# Patient Record
Sex: FEMALE | Race: WHITE | NOT HISPANIC OR LATINO | Employment: OTHER | URBAN - NONMETROPOLITAN AREA
[De-identification: names, ages, dates, MRNs, and addresses within clinical notes are randomized per-mention and may not be internally consistent; named-entity substitution may affect disease eponyms.]

---

## 2017-02-22 ENCOUNTER — APPOINTMENT (OUTPATIENT)
Dept: GENERAL RADIOLOGY | Facility: HOSPITAL | Age: 72
End: 2017-02-22

## 2017-02-22 ENCOUNTER — HOSPITAL ENCOUNTER (EMERGENCY)
Facility: HOSPITAL | Age: 72
Discharge: HOME | End: 2017-02-22
Attending: FAMILY MEDICINE | Admitting: FAMILY MEDICINE

## 2017-02-22 VITALS
WEIGHT: 170 LBS | RESPIRATION RATE: 22 BRPM | DIASTOLIC BLOOD PRESSURE: 72 MMHG | TEMPERATURE: 99.9 F | SYSTOLIC BLOOD PRESSURE: 170 MMHG | BODY MASS INDEX: 27.32 KG/M2 | HEIGHT: 66 IN | OXYGEN SATURATION: 92 % | HEART RATE: 87 BPM

## 2017-02-22 DIAGNOSIS — J40 BRONCHITIS: Primary | ICD-10-CM

## 2017-02-22 LAB
ALBUMIN SERPL-MCNC: 4.2 G/DL (ref 3.4–4.8)
ALBUMIN/GLOB SERPL: 1.3 G/DL (ref 1.1–1.8)
ALP SERPL-CCNC: 70 U/L (ref 38–126)
ALT SERPL W P-5'-P-CCNC: 26 U/L (ref 9–52)
ANION GAP SERPL CALCULATED.3IONS-SCNC: 14 MMOL/L (ref 5–15)
AST SERPL-CCNC: 34 U/L (ref 14–36)
BASOPHILS # BLD AUTO: 0.09 10*3/MM3 (ref 0–0.2)
BASOPHILS NFR BLD AUTO: 1.1 % (ref 0–2)
BILIRUB SERPL-MCNC: 0.6 MG/DL (ref 0.2–1.3)
BUN BLD-MCNC: 15 MG/DL (ref 7–21)
BUN/CREAT SERPL: 16.3 (ref 7–25)
CALCIUM SPEC-SCNC: 9.4 MG/DL (ref 8.4–10.2)
CHLORIDE SERPL-SCNC: 98 MMOL/L (ref 95–110)
CK MB SERPL-CCNC: 1.21 NG/ML (ref 0–5)
CK SERPL-CCNC: 232 U/L (ref 30–135)
CO2 SERPL-SCNC: 25 MMOL/L (ref 22–31)
CREAT BLD-MCNC: 0.92 MG/DL (ref 0.5–1)
DEPRECATED RDW RBC AUTO: 43.7 FL (ref 36.4–46.3)
EOSINOPHIL # BLD AUTO: 0.03 10*3/MM3 (ref 0–0.7)
EOSINOPHIL NFR BLD AUTO: 0.4 % (ref 0–7)
ERYTHROCYTE [DISTWIDTH] IN BLOOD BY AUTOMATED COUNT: 13.3 % (ref 11.5–14.5)
FLUAV AG NPH QL: NEGATIVE
FLUBV AG NPH QL IA: NEGATIVE
GFR SERPL CREATININE-BSD FRML MDRD: 60 ML/MIN/1.73 (ref 39–90)
GLOBULIN UR ELPH-MCNC: 3.3 GM/DL (ref 2.3–3.5)
GLUCOSE BLD-MCNC: 91 MG/DL (ref 60–100)
HCT VFR BLD AUTO: 37 % (ref 35–45)
HGB BLD-MCNC: 12.5 G/DL (ref 12–15.5)
IMM GRANULOCYTES # BLD: 0.02 10*3/MM3 (ref 0–0.02)
IMM GRANULOCYTES NFR BLD: 0.3 % (ref 0–0.5)
LYMPHOCYTES # BLD AUTO: 1.11 10*3/MM3 (ref 0.6–4.2)
LYMPHOCYTES NFR BLD AUTO: 14 % (ref 10–50)
MCH RBC QN AUTO: 30.6 PG (ref 26.5–34)
MCHC RBC AUTO-ENTMCNC: 33.8 G/DL (ref 31.4–36)
MCV RBC AUTO: 90.5 FL (ref 80–98)
MONOCYTES # BLD AUTO: 0.67 10*3/MM3 (ref 0–0.9)
MONOCYTES NFR BLD AUTO: 8.5 % (ref 0–12)
NEUTROPHILS # BLD AUTO: 6 10*3/MM3 (ref 2–8.6)
NEUTROPHILS NFR BLD AUTO: 75.7 % (ref 37–80)
NT-PROBNP SERPL-MCNC: 276 PG/ML (ref 0–900)
PLATELET # BLD AUTO: 212 10*3/MM3 (ref 150–450)
PMV BLD AUTO: 10.2 FL (ref 8–12)
POTASSIUM BLD-SCNC: 3.7 MMOL/L (ref 3.5–5.1)
PROT SERPL-MCNC: 7.5 G/DL (ref 6.3–8.6)
RBC # BLD AUTO: 4.09 10*6/MM3 (ref 3.77–5.16)
SODIUM BLD-SCNC: 137 MMOL/L (ref 137–145)
TROPONIN I SERPL-MCNC: <0.012 NG/ML
WBC NRBC COR # BLD: 7.92 10*3/MM3 (ref 3.2–9.8)

## 2017-02-22 PROCEDURE — 99283 EMERGENCY DEPT VISIT LOW MDM: CPT

## 2017-02-22 PROCEDURE — 82553 CREATINE MB FRACTION: CPT | Performed by: FAMILY MEDICINE

## 2017-02-22 PROCEDURE — 83880 ASSAY OF NATRIURETIC PEPTIDE: CPT | Performed by: FAMILY MEDICINE

## 2017-02-22 PROCEDURE — 85025 COMPLETE CBC W/AUTO DIFF WBC: CPT | Performed by: FAMILY MEDICINE

## 2017-02-22 PROCEDURE — 84484 ASSAY OF TROPONIN QUANT: CPT | Performed by: FAMILY MEDICINE

## 2017-02-22 PROCEDURE — 93005 ELECTROCARDIOGRAM TRACING: CPT | Performed by: FAMILY MEDICINE

## 2017-02-22 PROCEDURE — 82550 ASSAY OF CK (CPK): CPT | Performed by: FAMILY MEDICINE

## 2017-02-22 PROCEDURE — 87804 INFLUENZA ASSAY W/OPTIC: CPT | Performed by: FAMILY MEDICINE

## 2017-02-22 PROCEDURE — 93010 ELECTROCARDIOGRAM REPORT: CPT | Performed by: INTERNAL MEDICINE

## 2017-02-22 PROCEDURE — 96374 THER/PROPH/DIAG INJ IV PUSH: CPT

## 2017-02-22 PROCEDURE — 71020 HC CHEST PA AND LATERAL: CPT

## 2017-02-22 PROCEDURE — 80053 COMPREHEN METABOLIC PANEL: CPT | Performed by: FAMILY MEDICINE

## 2017-02-22 PROCEDURE — 25010000002 METHYLPREDNISOLONE PER 125 MG: Performed by: FAMILY MEDICINE

## 2017-02-22 RX ORDER — METHYLPREDNISOLONE SODIUM SUCCINATE 125 MG/2ML
125 INJECTION, POWDER, LYOPHILIZED, FOR SOLUTION INTRAMUSCULAR; INTRAVENOUS ONCE
Status: COMPLETED | OUTPATIENT
Start: 2017-02-22 | End: 2017-02-22

## 2017-02-22 RX ORDER — CITALOPRAM 20 MG/1
20 TABLET ORAL DAILY
COMMUNITY

## 2017-02-22 RX ORDER — LEVOTHYROXINE SODIUM 75 UG/1
75 CAPSULE ORAL DAILY
COMMUNITY

## 2017-02-22 RX ORDER — IPRATROPIUM BROMIDE AND ALBUTEROL SULFATE 2.5; .5 MG/3ML; MG/3ML
3 SOLUTION RESPIRATORY (INHALATION) ONCE
Status: COMPLETED | OUTPATIENT
Start: 2017-02-22 | End: 2017-02-22

## 2017-02-22 RX ORDER — OMEPRAZOLE 20 MG/1
20 CAPSULE, DELAYED RELEASE ORAL DAILY
COMMUNITY

## 2017-02-22 RX ORDER — METHYLPREDNISOLONE 4 MG/1
TABLET ORAL
Qty: 21 TABLET | Refills: 0 | Status: SHIPPED | OUTPATIENT
Start: 2017-02-22 | End: 2018-02-09

## 2017-02-22 RX ORDER — ALBUTEROL SULFATE 90 UG/1
2 AEROSOL, METERED RESPIRATORY (INHALATION) EVERY 4 HOURS PRN
Qty: 1 INHALER | Refills: 0 | Status: ON HOLD | OUTPATIENT
Start: 2017-02-22 | End: 2018-05-24

## 2017-02-22 RX ORDER — TEMAZEPAM 30 MG/1
30 CAPSULE ORAL NIGHTLY PRN
COMMUNITY

## 2017-02-22 RX ORDER — LOSARTAN POTASSIUM 50 MG/1
100 TABLET ORAL DAILY
COMMUNITY

## 2017-02-22 RX ADMIN — IPRATROPIUM BROMIDE AND ALBUTEROL SULFATE 3 ML: 2.5; .5 SOLUTION RESPIRATORY (INHALATION) at 21:01

## 2017-02-22 RX ADMIN — METHYLPREDNISOLONE SODIUM SUCCINATE 125 MG: 125 INJECTION, POWDER, FOR SOLUTION INTRAMUSCULAR; INTRAVENOUS at 21:05

## 2017-02-23 LAB
HOLD SPECIMEN: NORMAL
WHOLE BLOOD HOLD SPECIMEN: NORMAL

## 2017-02-23 NOTE — ED NOTES
Instructed to walk Pt around ED to monitor O2 level.Prior to walk pt O2 level was 91%, during the walk the Pt's O2 level dropped to 88%.      Prakash Limon  02/22/17 8490

## 2017-02-23 NOTE — ED PROVIDER NOTES
Subjective   HPI Comments: Pt placed on Doxycycline and albuterol yesterday at walk in clinic.     Patient is a 71 y.o. female presenting with shortness of breath.   Shortness of Breath   Severity:  Moderate  Onset quality:  Gradual  Duration:  5 days  Timing:  Constant  Progression:  Worsening  Chronicity:  New  Relieved by:  Nothing  Worsened by:  Nothing  Associated symptoms: chest pain (pleuritic on right), cough, fever (100.4) and wheezing    Associated symptoms: no abdominal pain, no diaphoresis, no ear pain, no headaches, no neck pain, no rash, no sore throat, no sputum production, no syncope and no vomiting    Risk factors: hx of cancer (breast CA)    Risk factors: no recent alcohol use, no obesity, no recent surgery and no tobacco use        Review of Systems   Constitutional: Positive for fever (100.4). Negative for appetite change, chills, diaphoresis and fatigue.   HENT: Negative for congestion, ear discharge, ear pain, nosebleeds, rhinorrhea, sinus pressure, sore throat and trouble swallowing.    Eyes: Negative for discharge and redness.   Respiratory: Positive for cough, shortness of breath and wheezing. Negative for apnea, sputum production and chest tightness.    Cardiovascular: Positive for chest pain (pleuritic on right). Negative for syncope.   Gastrointestinal: Negative for abdominal pain, diarrhea, nausea and vomiting.   Endocrine: Negative for polyuria.   Genitourinary: Negative for dysuria, frequency and urgency.   Musculoskeletal: Negative for myalgias and neck pain.   Skin: Negative for color change and rash.   Allergic/Immunologic: Negative for immunocompromised state.   Neurological: Negative for dizziness, seizures, syncope, weakness, light-headedness and headaches.   Hematological: Negative for adenopathy. Does not bruise/bleed easily.   Psychiatric/Behavioral: Negative for behavioral problems and confusion.   All other systems reviewed and are negative.      Past Medical History    Diagnosis Date   • Cancer    • Disease of thyroid gland    • Hypertension        Allergies   Allergen Reactions   • Penicillins        Past Surgical History   Procedure Laterality Date   • Mastectomy Right    • Appendectomy         History reviewed. No pertinent family history.    Social History     Social History   • Marital status:      Spouse name: N/A   • Number of children: N/A   • Years of education: N/A     Social History Main Topics   • Smoking status: Never Smoker   • Smokeless tobacco: None   • Alcohol use No   • Drug use: No   • Sexual activity: Not Asked     Other Topics Concern   • None     Social History Narrative   • None           Objective   Physical Exam   Constitutional: She is oriented to person, place, and time. She appears well-developed and well-nourished.   HENT:   Head: Normocephalic and atraumatic.   Nose: Nose normal.   Mouth/Throat: Oropharynx is clear and moist.   Eyes: Conjunctivae and EOM are normal. Pupils are equal, round, and reactive to light. Right eye exhibits no discharge. Left eye exhibits no discharge. No scleral icterus.   Neck: Normal range of motion. Neck supple. No tracheal deviation present.   Cardiovascular: Normal rate, regular rhythm and normal heart sounds.    No murmur heard.  Pulmonary/Chest: Effort normal. No stridor. No respiratory distress. She has decreased breath sounds. She has wheezes. She has rhonchi. She has no rales.   Abdominal: Soft. Bowel sounds are normal. She exhibits no distension and no mass. There is no tenderness. There is no rebound and no guarding.   Musculoskeletal: She exhibits no edema.   Neurological: She is alert and oriented to person, place, and time. Coordination normal.   Skin: Skin is warm and dry. No rash noted. No erythema.   Psychiatric: She has a normal mood and affect. Her behavior is normal. Thought content normal.   Nursing note and vitals reviewed.      Procedures         ED Course  ED Course   Comment By Time    Observation admission offered to patient for decrease O2 sat with ambulation, however patient states she feels fine and prefers to go home.  She will return if symptoms do not improve and she understands the importance of following up with her family medicine doctor, Dr. Villegas.  Patient is currently on day 2 of doxycycline, and will continue to take antibiotics at home. Kamran Ramirez MD 02/22 1256        Labs Reviewed   CK - Abnormal; Notable for the following:        Result Value    Creatine Kinase 232 (*)     All other components within normal limits   INFLUENZA ANTIGEN - Normal   COMPREHENSIVE METABOLIC PANEL - Normal   BNP (IN-HOUSE) - Normal   CK MB - Normal   TROPONIN (IN-HOUSE) - Normal   CBC WITH AUTO DIFFERENTIAL - Normal   RAPID STREP A SCREEN   RAINBOW DRAW    Narrative:     The following orders were created for panel order [place] Draw.  Procedure                               Abnormality         Status                     ---------                               -----------         ------                     Light Blue Top[[id number]]                                    In process                 Gold Top - SST[[id number]]                                    In process                   Please view results for these tests on the individual orders.   CBC AND DIFFERENTIAL    Narrative:     The following orders were created for panel order CBC & Differential.  Procedure                               Abnormality         Status                     ---------                               -----------         ------                     CBC Auto Differential[[id number]]         Normal              Final result                 Please view results for these tests on the individual orders.   LIGHT BLUE TOP   GOLD TOP - SST       XR Chest 2 View   Final Result   Mild cardiomegaly without radiographic evidence of   acute cardiopulmonary disease.      Electronically signed by:  Ida Anne MD  2/22/2017 8:39 PM CST    Workstation: [facility]    Final diagnoses:   Bronchitis            Kamran Ramirez MD  02/22/17 2572

## 2017-05-25 ENCOUNTER — HOSPITAL ENCOUNTER (EMERGENCY)
Facility: HOSPITAL | Age: 72
Discharge: HOME | End: 2017-05-25
Attending: EMERGENCY MEDICINE | Admitting: EMERGENCY MEDICINE

## 2017-05-25 ENCOUNTER — APPOINTMENT (OUTPATIENT)
Dept: GENERAL RADIOLOGY | Facility: HOSPITAL | Age: 72
End: 2017-05-25

## 2017-05-25 VITALS
RESPIRATION RATE: 68 BRPM | SYSTOLIC BLOOD PRESSURE: 187 MMHG | DIASTOLIC BLOOD PRESSURE: 83 MMHG | TEMPERATURE: 98 F | WEIGHT: 170 LBS | OXYGEN SATURATION: 97 % | HEART RATE: 64 BPM | BODY MASS INDEX: 27.32 KG/M2 | HEIGHT: 66 IN

## 2017-05-25 DIAGNOSIS — R07.89 CHEST WALL PAIN: ICD-10-CM

## 2017-05-25 DIAGNOSIS — R07.9 CHEST PAIN, UNSPECIFIED TYPE: ICD-10-CM

## 2017-05-25 DIAGNOSIS — R06.02 SHORTNESS OF BREATH: Primary | ICD-10-CM

## 2017-05-25 LAB
BASOPHILS # BLD AUTO: 0.04 10*3/MM3 (ref 0–0.2)
BASOPHILS NFR BLD AUTO: 0.5 % (ref 0–2)
CK MB SERPL-CCNC: 0.76 NG/ML (ref 0–5)
CK SERPL-CCNC: 72 U/L (ref 30–135)
DEPRECATED RDW RBC AUTO: 44.1 FL (ref 36.4–46.3)
EOSINOPHIL # BLD AUTO: 0.18 10*3/MM3 (ref 0–0.7)
EOSINOPHIL NFR BLD AUTO: 2.3 % (ref 0–7)
ERYTHROCYTE [DISTWIDTH] IN BLOOD BY AUTOMATED COUNT: 13.3 % (ref 11.5–14.5)
HCT VFR BLD AUTO: 38.9 % (ref 35–45)
HGB BLD-MCNC: 13 G/DL (ref 12–15.5)
HOLD SPECIMEN: NORMAL
HOLD SPECIMEN: NORMAL
IMM GRANULOCYTES # BLD: 0.02 10*3/MM3 (ref 0–0.02)
IMM GRANULOCYTES NFR BLD: 0.3 % (ref 0–0.5)
INR PPP: 1.01 (ref 0.8–1.2)
LIPASE SERPL-CCNC: 81 U/L (ref 23–300)
LYMPHOCYTES # BLD AUTO: 1.49 10*3/MM3 (ref 0.6–4.2)
LYMPHOCYTES NFR BLD AUTO: 18.8 % (ref 10–50)
MAGNESIUM SERPL-MCNC: 2.3 MG/DL (ref 1.6–2.3)
MCH RBC QN AUTO: 30.2 PG (ref 26.5–34)
MCHC RBC AUTO-ENTMCNC: 33.4 G/DL (ref 31.4–36)
MCV RBC AUTO: 90.3 FL (ref 80–98)
MONOCYTES # BLD AUTO: 0.58 10*3/MM3 (ref 0–0.9)
MONOCYTES NFR BLD AUTO: 7.3 % (ref 0–12)
NEUTROPHILS # BLD AUTO: 5.6 10*3/MM3 (ref 2–8.6)
NEUTROPHILS NFR BLD AUTO: 70.8 % (ref 37–80)
NT-PROBNP SERPL-MCNC: 61.7 PG/ML (ref 0–900)
PLATELET # BLD AUTO: 240 10*3/MM3 (ref 150–450)
PMV BLD AUTO: 9.7 FL (ref 8–12)
PROTHROMBIN TIME: 13.2 SECONDS (ref 11.1–15.3)
RBC # BLD AUTO: 4.31 10*6/MM3 (ref 3.77–5.16)
TROPONIN I SERPL-MCNC: <0.012 NG/ML
TSH SERPL DL<=0.05 MIU/L-ACNC: 1.01 MIU/ML (ref 0.46–4.68)
WBC NRBC COR # BLD: 7.91 10*3/MM3 (ref 3.2–9.8)
WHOLE BLOOD HOLD SPECIMEN: NORMAL
WHOLE BLOOD HOLD SPECIMEN: NORMAL

## 2017-05-25 PROCEDURE — 71020 HC CHEST PA AND LATERAL: CPT

## 2017-05-25 PROCEDURE — 84443 ASSAY THYROID STIM HORMONE: CPT | Performed by: EMERGENCY MEDICINE

## 2017-05-25 PROCEDURE — 83735 ASSAY OF MAGNESIUM: CPT | Performed by: EMERGENCY MEDICINE

## 2017-05-25 PROCEDURE — 83690 ASSAY OF LIPASE: CPT | Performed by: EMERGENCY MEDICINE

## 2017-05-25 PROCEDURE — 85025 COMPLETE CBC W/AUTO DIFF WBC: CPT | Performed by: EMERGENCY MEDICINE

## 2017-05-25 PROCEDURE — 93010 ELECTROCARDIOGRAM REPORT: CPT | Performed by: INTERNAL MEDICINE

## 2017-05-25 PROCEDURE — 84484 ASSAY OF TROPONIN QUANT: CPT | Performed by: EMERGENCY MEDICINE

## 2017-05-25 PROCEDURE — 82553 CREATINE MB FRACTION: CPT | Performed by: EMERGENCY MEDICINE

## 2017-05-25 PROCEDURE — 82550 ASSAY OF CK (CPK): CPT | Performed by: EMERGENCY MEDICINE

## 2017-05-25 PROCEDURE — 93005 ELECTROCARDIOGRAM TRACING: CPT

## 2017-05-25 PROCEDURE — 83880 ASSAY OF NATRIURETIC PEPTIDE: CPT | Performed by: EMERGENCY MEDICINE

## 2017-05-25 PROCEDURE — 93005 ELECTROCARDIOGRAM TRACING: CPT | Performed by: EMERGENCY MEDICINE

## 2017-05-25 PROCEDURE — 99284 EMERGENCY DEPT VISIT MOD MDM: CPT

## 2017-05-25 PROCEDURE — 85610 PROTHROMBIN TIME: CPT | Performed by: EMERGENCY MEDICINE

## 2017-05-25 RX ORDER — ATORVASTATIN CALCIUM 10 MG/1
10 TABLET, FILM COATED ORAL DAILY
COMMUNITY
End: 2018-05-01

## 2017-05-25 RX ORDER — LEVOTHYROXINE SODIUM 0.12 MG/1
125 TABLET ORAL DAILY
Status: ON HOLD | COMMUNITY
End: 2018-05-24 | Stop reason: SDUPTHER

## 2017-05-25 RX ORDER — SODIUM CHLORIDE 0.9 % (FLUSH) 0.9 %
10 SYRINGE (ML) INJECTION AS NEEDED
Status: DISCONTINUED | OUTPATIENT
Start: 2017-05-25 | End: 2017-05-26 | Stop reason: HOSPADM

## 2017-05-25 RX ORDER — TEMAZEPAM 30 MG/1
30 CAPSULE ORAL NIGHTLY PRN
COMMUNITY
End: 2018-05-01 | Stop reason: SDUPTHER

## 2017-05-25 RX ORDER — NADOLOL 20 MG/1
20 TABLET ORAL DAILY
Status: ON HOLD | COMMUNITY
End: 2018-05-24

## 2017-05-25 RX ORDER — CITALOPRAM 10 MG/1
10 TABLET ORAL DAILY
Status: ON HOLD | COMMUNITY
End: 2018-05-24 | Stop reason: SDUPTHER

## 2017-05-25 RX ORDER — LEVOFLOXACIN 500 MG/1
500 TABLET, FILM COATED ORAL DAILY
Qty: 10 TABLET | Refills: 0 | Status: SHIPPED | OUTPATIENT
Start: 2017-05-25 | End: 2018-05-01

## 2017-08-10 ENCOUNTER — TRANSCRIBE ORDERS (OUTPATIENT)
Dept: LAB | Facility: HOSPITAL | Age: 72
End: 2017-08-10

## 2017-08-10 ENCOUNTER — LAB (OUTPATIENT)
Dept: LAB | Facility: HOSPITAL | Age: 72
End: 2017-08-10

## 2017-08-10 DIAGNOSIS — M32.9 LUPUS (HCC): ICD-10-CM

## 2017-08-10 DIAGNOSIS — M32.9 LUPUS (HCC): Primary | ICD-10-CM

## 2017-08-10 LAB
ALBUMIN SERPL-MCNC: 4.6 G/DL (ref 3.4–4.8)
ALBUMIN/GLOB SERPL: 1.6 G/DL (ref 1.1–1.8)
ALP SERPL-CCNC: 75 U/L (ref 38–126)
ALT SERPL W P-5'-P-CCNC: 33 U/L (ref 9–52)
ANION GAP SERPL CALCULATED.3IONS-SCNC: 9 MMOL/L (ref 5–15)
AST SERPL-CCNC: 27 U/L (ref 14–36)
BASOPHILS # BLD AUTO: 0.04 10*3/MM3 (ref 0–0.2)
BASOPHILS NFR BLD AUTO: 0.6 % (ref 0–2)
BILIRUB SERPL-MCNC: 0.4 MG/DL (ref 0.2–1.3)
BILIRUB UR QL STRIP: NEGATIVE
BUN BLD-MCNC: 23 MG/DL (ref 7–21)
BUN/CREAT SERPL: 25.3 (ref 7–25)
CALCIUM SPEC-SCNC: 9.4 MG/DL (ref 8.4–10.2)
CHLORIDE SERPL-SCNC: 102 MMOL/L (ref 95–110)
CLARITY UR: CLEAR
CO2 SERPL-SCNC: 30 MMOL/L (ref 22–31)
COLOR UR: YELLOW
CREAT BLD-MCNC: 0.91 MG/DL (ref 0.5–1)
DEPRECATED RDW RBC AUTO: 45.6 FL (ref 36.4–46.3)
EOSINOPHIL # BLD AUTO: 0.26 10*3/MM3 (ref 0–0.7)
EOSINOPHIL NFR BLD AUTO: 4.2 % (ref 0–7)
ERYTHROCYTE [DISTWIDTH] IN BLOOD BY AUTOMATED COUNT: 13.8 % (ref 11.5–14.5)
ERYTHROCYTE [SEDIMENTATION RATE] IN BLOOD: 10 MM/HR (ref 0–20)
GFR SERPL CREATININE-BSD FRML MDRD: 61 ML/MIN/1.73 (ref 39–90)
GLOBULIN UR ELPH-MCNC: 2.9 GM/DL (ref 2.3–3.5)
GLUCOSE BLD-MCNC: 84 MG/DL (ref 60–100)
GLUCOSE UR STRIP-MCNC: NEGATIVE MG/DL
HCT VFR BLD AUTO: 38.8 % (ref 35–45)
HGB BLD-MCNC: 13 G/DL (ref 12–15.5)
HGB UR QL STRIP.AUTO: NEGATIVE
IMM GRANULOCYTES # BLD: 0.01 10*3/MM3 (ref 0–0.02)
IMM GRANULOCYTES NFR BLD: 0.2 % (ref 0–0.5)
KETONES UR QL STRIP: NEGATIVE
LEUKOCYTE ESTERASE UR QL STRIP.AUTO: ABNORMAL
LYMPHOCYTES # BLD AUTO: 1.66 10*3/MM3 (ref 0.6–4.2)
LYMPHOCYTES NFR BLD AUTO: 26.8 % (ref 10–50)
MCH RBC QN AUTO: 30.6 PG (ref 26.5–34)
MCHC RBC AUTO-ENTMCNC: 33.5 G/DL (ref 31.4–36)
MCV RBC AUTO: 91.3 FL (ref 80–98)
MONOCYTES # BLD AUTO: 0.42 10*3/MM3 (ref 0–0.9)
MONOCYTES NFR BLD AUTO: 6.8 % (ref 0–12)
NEUTROPHILS # BLD AUTO: 3.8 10*3/MM3 (ref 2–8.6)
NEUTROPHILS NFR BLD AUTO: 61.4 % (ref 37–80)
NITRITE UR QL STRIP: NEGATIVE
NRBC BLD MANUAL-RTO: 0 /100 WBC (ref 0–0)
PH UR STRIP.AUTO: <=5 [PH] (ref 5–9)
PLATELET # BLD AUTO: 281 10*3/MM3 (ref 150–450)
PMV BLD AUTO: 9.4 FL (ref 8–12)
POTASSIUM BLD-SCNC: 3.9 MMOL/L (ref 3.5–5.1)
PROT SERPL-MCNC: 7.5 G/DL (ref 6.3–8.6)
PROT UR QL STRIP: NEGATIVE
RBC # BLD AUTO: 4.25 10*6/MM3 (ref 3.77–5.16)
SODIUM BLD-SCNC: 141 MMOL/L (ref 137–145)
SP GR UR STRIP: 1.02 (ref 1–1.03)
UROBILINOGEN UR QL STRIP: ABNORMAL
WBC NRBC COR # BLD: 6.19 10*3/MM3 (ref 3.2–9.8)

## 2017-08-10 PROCEDURE — 85651 RBC SED RATE NONAUTOMATED: CPT

## 2017-08-10 PROCEDURE — 36415 COLL VENOUS BLD VENIPUNCTURE: CPT

## 2017-08-10 PROCEDURE — 85025 COMPLETE CBC W/AUTO DIFF WBC: CPT

## 2017-08-10 PROCEDURE — 86038 ANTINUCLEAR ANTIBODIES: CPT

## 2017-08-10 PROCEDURE — 80053 COMPREHEN METABOLIC PANEL: CPT

## 2017-08-10 PROCEDURE — 81003 URINALYSIS AUTO W/O SCOPE: CPT

## 2017-08-11 LAB — ANA SER QL: NEGATIVE

## 2018-02-09 ENCOUNTER — OFFICE VISIT (OUTPATIENT)
Dept: GASTROENTEROLOGY | Facility: CLINIC | Age: 73
End: 2018-02-09

## 2018-02-09 VITALS
BODY MASS INDEX: 26.87 KG/M2 | HEIGHT: 66 IN | HEART RATE: 83 BPM | WEIGHT: 167.2 LBS | DIASTOLIC BLOOD PRESSURE: 82 MMHG | SYSTOLIC BLOOD PRESSURE: 134 MMHG

## 2018-02-09 DIAGNOSIS — Z12.11 ENCOUNTER FOR SCREENING COLONOSCOPY: Primary | ICD-10-CM

## 2018-02-09 PROCEDURE — S0260 H&P FOR SURGERY: HCPCS | Performed by: NURSE PRACTITIONER

## 2018-02-09 RX ORDER — POLYETHYLENE GLYCOL 3350, SODIUM CHLORIDE, SODIUM BICARBONATE, POTASSIUM CHLORIDE 420; 11.2; 5.72; 1.48 G/4L; G/4L; G/4L; G/4L
4000 POWDER, FOR SOLUTION ORAL ONCE
Qty: 4000 ML | Refills: 0 | Status: SHIPPED | OUTPATIENT
Start: 2018-02-09 | End: 2018-02-09

## 2018-02-09 RX ORDER — DEXTROSE AND SODIUM CHLORIDE 5; .45 G/100ML; G/100ML
30 INJECTION, SOLUTION INTRAVENOUS CONTINUOUS PRN
Status: CANCELLED | OUTPATIENT
Start: 2018-03-02

## 2018-02-09 NOTE — PROGRESS NOTES
Chief Complaint   Patient presents with   • Colonoscopy     screening       Subjective    Em Munoz is a 72 y.o. female. she is being seen for consultation today at the request of Dr. Villegas  History of Present Illness  72-year-old female presents to discuss screening colonoscopy.  She denies any current abdominal pain.  States she has occasional epigastric and right upper quadrant pain she takes omeprazole daily.  She denies any nausea vomiting or dysphagia.  States her bowel movements are regular with no melena or hematochezia.  States her last colonoscopy was 10+ years ago do not have any reports of that to review.  Plan; schedule patient for screening colonoscopy.     The following portions of the patient's history were reviewed and updated as appropriate:   Past Medical History:   Diagnosis Date   • Cancer    • Disease of thyroid gland    • Hypertension      Past Surgical History:   Procedure Laterality Date   • APPENDECTOMY     • CHOLECYSTECTOMY     • MASTECTOMY Right      Family History   Problem Relation Age of Onset   • Heart attack Father    • Diabetes Father    • Hypertension Sister    • Diabetes Sister    • Heart attack Brother    • Hypertension Brother    • Stroke Brother    • Hypertension Sister      OB History     No data available        Current Outpatient Prescriptions   Medication Sig Dispense Refill   • albuterol (PROVENTIL HFA;VENTOLIN HFA) 108 (90 BASE) MCG/ACT inhaler Inhale 2 puffs Every 4 (Four) Hours As Needed for wheezing. 1 inhaler 0   • citalopram (CeleXA) 20 MG tablet Take 20 mg by mouth Daily.     • levothyroxine sodium (TIROSINT) 75 MCG capsule Take 75 mcg by mouth Daily.     • losartan (COZAAR) 50 MG tablet Take 50 mg by mouth Daily.     • omeprazole (priLOSEC) 20 MG capsule Take 20 mg by mouth Daily.     • temazepam (RESTORIL) 30 MG capsule Take 30 mg by mouth At Night As Needed for sleep.       No current facility-administered medications for this visit.      Allergies  "  Allergen Reactions   • Atorvastatin Other (See Comments)     Muscle pain   • Penicillins      Social History     Social History   • Marital status:      Spouse name: N/A   • Number of children: N/A   • Years of education: N/A     Social History Main Topics   • Smoking status: Never Smoker   • Smokeless tobacco: Never Used   • Alcohol use No   • Drug use: No   • Sexual activity: Defer     Other Topics Concern   • None     Social History Narrative       Review of Systems  Review of Systems   Constitutional: Negative for activity change, appetite change, chills, diaphoresis, fatigue, fever and unexpected weight change.   HENT: Negative for sore throat and trouble swallowing.    Respiratory: Positive for cough (better with omeprazole ). Negative for shortness of breath.    Gastrointestinal: Negative for abdominal distention, abdominal pain, anal bleeding, blood in stool, constipation, diarrhea, nausea, rectal pain and vomiting.   Musculoskeletal: Negative for arthralgias.   Skin: Negative for pallor.   Neurological: Negative for light-headedness.        /82 (BP Location: Left arm, Patient Position: Sitting, Cuff Size: Adult)  Pulse 83  Ht 167.6 cm (65.98\")  Wt 75.8 kg (167 lb 3.2 oz)  BMI 27 kg/m2    Objective    Physical Exam   Constitutional: She is oriented to person, place, and time. She appears well-developed and well-nourished. She is cooperative. No distress.   HENT:   Head: Normocephalic and atraumatic.   Neck: Normal range of motion. Neck supple. No thyromegaly present.   Cardiovascular: Normal rate, regular rhythm and normal heart sounds.    Pulmonary/Chest: Effort normal and breath sounds normal. She has no wheezes. She has no rhonchi. She has no rales.   Abdominal: Soft. Normal appearance and bowel sounds are normal. She exhibits no shifting dullness and no distension. There is no hepatosplenomegaly. There is no tenderness. There is no rigidity and no guarding. No hernia. "   Lymphadenopathy:     She has no cervical adenopathy.   Neurological: She is alert and oriented to person, place, and time.   Skin: Skin is warm, dry and intact. No rash noted. No pallor.   Psychiatric: She has a normal mood and affect. Her speech is normal.     Lab on 08/10/2017   Component Date Value Ref Range Status   • Glucose 08/10/2017 84  60 - 100 mg/dL Final   • BUN 08/10/2017 23* 7 - 21 mg/dL Final   • Creatinine 08/10/2017 0.91  0.50 - 1.00 mg/dL Final   • Sodium 08/10/2017 141  137 - 145 mmol/L Final   • Potassium 08/10/2017 3.9  3.5 - 5.1 mmol/L Final   • Chloride 08/10/2017 102  95 - 110 mmol/L Final   • CO2 08/10/2017 30.0  22.0 - 31.0 mmol/L Final   • Calcium 08/10/2017 9.4  8.4 - 10.2 mg/dL Final   • Total Protein 08/10/2017 7.5  6.3 - 8.6 g/dL Final   • Albumin 08/10/2017 4.60  3.40 - 4.80 g/dL Final   • ALT (SGPT) 08/10/2017 33  9 - 52 U/L Final   • AST (SGOT) 08/10/2017 27  14 - 36 U/L Final   • Alkaline Phosphatase 08/10/2017 75  38 - 126 U/L Final   • Total Bilirubin 08/10/2017 0.4  0.2 - 1.3 mg/dL Final   • eGFR Non African Amer 08/10/2017 61  39 - 90 mL/min/1.73 Final   • Globulin 08/10/2017 2.9  2.3 - 3.5 gm/dL Final   • A/G Ratio 08/10/2017 1.6  1.1 - 1.8 g/dL Final   • BUN/Creatinine Ratio 08/10/2017 25.3* 7.0 - 25.0 Final   • Anion Gap 08/10/2017 9.0  5.0 - 15.0 mmol/L Final   • Sed Rate 08/10/2017 10  0 - 20 mm/hr Final   • Color, UA 08/10/2017 Yellow  Yellow, Straw, Dark Yellow, Vivian Final   • Appearance, UA 08/10/2017 Clear  Clear Final   • pH, UA 08/10/2017 <=5.0  5.0 - 9.0 Final   • Specific Gravity, UA 08/10/2017 1.020  1.003 - 1.030 Final   • Glucose, UA 08/10/2017 Negative  Negative Final   • Ketones, UA 08/10/2017 Negative  Negative Final   • Bilirubin, UA 08/10/2017 Negative  Negative Final   • Blood, UA 08/10/2017 Negative  Negative Final   • Protein, UA 08/10/2017 Negative  Negative Final   • Leuk Esterase, UA 08/10/2017 Trace* Negative Final   • Nitrite, UA 08/10/2017  Negative  Negative Final   • Urobilinogen, UA 08/10/2017 0.2 E.U./dL  0.2 - 1.0 E.U./dL Final   • STORMY Direct 08/10/2017 Negative  Negative Final   • WBC 08/10/2017 6.19  3.20 - 9.80 10*3/mm3 Final   • RBC 08/10/2017 4.25  3.77 - 5.16 10*6/mm3 Final   • Hemoglobin 08/10/2017 13.0  12.0 - 15.5 g/dL Final   • Hematocrit 08/10/2017 38.8  35.0 - 45.0 % Final   • MCV 08/10/2017 91.3  80.0 - 98.0 fL Final   • MCH 08/10/2017 30.6  26.5 - 34.0 pg Final   • MCHC 08/10/2017 33.5  31.4 - 36.0 g/dL Final   • RDW 08/10/2017 13.8  11.5 - 14.5 % Final   • RDW-SD 08/10/2017 45.6  36.4 - 46.3 fl Final   • MPV 08/10/2017 9.4  8.0 - 12.0 fL Final   • Platelets 08/10/2017 281  150 - 450 10*3/mm3 Final   • Neutrophil % 08/10/2017 61.4  37.0 - 80.0 % Final   • Lymphocyte % 08/10/2017 26.8  10.0 - 50.0 % Final   • Monocyte % 08/10/2017 6.8  0.0 - 12.0 % Final   • Eosinophil % 08/10/2017 4.2  0.0 - 7.0 % Final   • Basophil % 08/10/2017 0.6  0.0 - 2.0 % Final   • Immature Grans % 08/10/2017 0.2  0.0 - 0.5 % Final   • Neutrophils, Absolute 08/10/2017 3.80  2.00 - 8.60 10*3/mm3 Final   • Lymphocytes, Absolute 08/10/2017 1.66  0.60 - 4.20 10*3/mm3 Final   • Monocytes, Absolute 08/10/2017 0.42  0.00 - 0.90 10*3/mm3 Final   • Eosinophils, Absolute 08/10/2017 0.26  0.00 - 0.70 10*3/mm3 Final   • Basophils, Absolute 08/10/2017 0.04  0.00 - 0.20 10*3/mm3 Final   • Immature Grans, Absolute 08/10/2017 0.01  0.00 - 0.02 10*3/mm3 Final   • nRBC 08/10/2017 0.0  0.0 - 0.0 /100 WBC Final     Assessment/Plan      1. Encounter for screening colonoscopy    .       Orders placed during this encounter include:    COLONOSCOPY  (N/A)    Review and/or summary of lab tests, radiology, procedures, medications. Review and summary of old records and obtaining of history. The risks and benefits of my recommendations, as well as other treatment options were discussed with the patient today. Questions were answered.    New Medications Ordered This Visit   Medications   •  polyethylene glycol-electrolytes (NULYTELY WITH FLAVOR PACKS) 420 g solution     Sig: Take 4,000 mL by mouth 1 (One) Time for 1 dose.     Dispense:  4000 mL     Refill:  0       Follow-up: Return in about 4 weeks (around 3/9/2018).          This document has been electronically signed by CATE Llanos on February 19, 2018 3:31 PM             Results for orders placed or performed in visit on 08/10/17   CBC Auto Differential   Result Value Ref Range    WBC 6.19 3.20 - 9.80 10*3/mm3    RBC 4.25 3.77 - 5.16 10*6/mm3    Hemoglobin 13.0 12.0 - 15.5 g/dL    Hematocrit 38.8 35.0 - 45.0 %    MCV 91.3 80.0 - 98.0 fL    MCH 30.6 26.5 - 34.0 pg    MCHC 33.5 31.4 - 36.0 g/dL    RDW 13.8 11.5 - 14.5 %    RDW-SD 45.6 36.4 - 46.3 fl    MPV 9.4 8.0 - 12.0 fL    Platelets 281 150 - 450 10*3/mm3    Neutrophil % 61.4 37.0 - 80.0 %    Lymphocyte % 26.8 10.0 - 50.0 %    Monocyte % 6.8 0.0 - 12.0 %    Eosinophil % 4.2 0.0 - 7.0 %    Basophil % 0.6 0.0 - 2.0 %    Immature Grans % 0.2 0.0 - 0.5 %    Neutrophils, Absolute 3.80 2.00 - 8.60 10*3/mm3    Lymphocytes, Absolute 1.66 0.60 - 4.20 10*3/mm3    Monocytes, Absolute 0.42 0.00 - 0.90 10*3/mm3    Eosinophils, Absolute 0.26 0.00 - 0.70 10*3/mm3    Basophils, Absolute 0.04 0.00 - 0.20 10*3/mm3    Immature Grans, Absolute 0.01 0.00 - 0.02 10*3/mm3    nRBC 0.0 0.0 - 0.0 /100 WBC   Urinalysis   Result Value Ref Range    Color, UA Yellow Yellow, Straw, Dark Yellow, Vivian    Appearance, UA Clear Clear    pH, UA <=5.0 5.0 - 9.0    Specific Gravity, UA 1.020 1.003 - 1.030    Glucose, UA Negative Negative    Ketones, UA Negative Negative    Bilirubin, UA Negative Negative    Blood, UA Negative Negative    Protein, UA Negative Negative    Leuk Esterase, UA Trace (A) Negative    Nitrite, UA Negative Negative    Urobilinogen, UA 0.2 E.U./dL 0.2 - 1.0 E.U./dL   Sedimentation Rate   Result Value Ref Range    Sed Rate 10 0 - 20 mm/hr   STORMY   Result Value Ref Range    STORMY Direct Negative Negative    Comprehensive Metabolic Panel   Result Value Ref Range    Glucose 84 60 - 100 mg/dL    BUN 23 (H) 7 - 21 mg/dL    Creatinine 0.91 0.50 - 1.00 mg/dL    Sodium 141 137 - 145 mmol/L    Potassium 3.9 3.5 - 5.1 mmol/L    Chloride 102 95 - 110 mmol/L    CO2 30.0 22.0 - 31.0 mmol/L    Calcium 9.4 8.4 - 10.2 mg/dL    Total Protein 7.5 6.3 - 8.6 g/dL    Albumin 4.60 3.40 - 4.80 g/dL    ALT (SGPT) 33 9 - 52 U/L    AST (SGOT) 27 14 - 36 U/L    Alkaline Phosphatase 75 38 - 126 U/L    Total Bilirubin 0.4 0.2 - 1.3 mg/dL    eGFR Non African Amer 61 39 - 90 mL/min/1.73    Globulin 2.9 2.3 - 3.5 gm/dL    A/G Ratio 1.6 1.1 - 1.8 g/dL    BUN/Creatinine Ratio 25.3 (H) 7.0 - 25.0    Anion Gap 9.0 5.0 - 15.0 mmol/L   Results for orders placed or performed during the hospital encounter of 02/22/17   [facility] - SST   Result Value Ref Range    Extra Tube Hold for add-ons.    CK-MB   Result Value Ref Range    CKMB 1.21 0.00 - 5.00 ng/mL   CBC Auto Differential   Result Value Ref Range    WBC 7.92 3.20 - 9.80 10*3/mm3    RBC 4.09 3.77 - 5.16 10*6/mm3    Hemoglobin 12.5 12.0 - 15.5 g/dL    Hematocrit 37.0 35.0 - 45.0 %    MCV 90.5 80.0 - 98.0 fL    MCH 30.6 26.5 - 34.0 pg    MCHC 33.8 31.4 - 36.0 g/dL    RDW 13.3 11.5 - 14.5 %    RDW-SD 43.7 36.4 - 46.3 fl    MPV 10.2 8.0 - 12.0 fL    Platelets 212 150 - 450 10*3/mm3    Neutrophil % 75.7 37.0 - 80.0 %    Lymphocyte % 14.0 10.0 - 50.0 %    Monocyte % 8.5 0.0 - 12.0 %    Eosinophil % 0.4 0.0 - 7.0 %    Basophil % 1.1 0.0 - 2.0 %    Immature Grans % 0.3 0.0 - 0.5 %    Neutrophils, Absolute 6.00 2.00 - 8.60 10*3/mm3    Lymphocytes, Absolute 1.11 0.60 - 4.20 10*3/mm3    Monocytes, Absolute 0.67 0.00 - 0.90 10*3/mm3    Eosinophils, Absolute 0.03 0.00 - 0.70 10*3/mm3    Basophils, Absolute 0.09 0.00 - 0.20 10*3/mm3    Immature Grans, Absolute 0.02 0.00 - 0.02 10*3/mm3   Light Blue Top   Result Value Ref Range    Extra Tube hold for add-on    Troponin   Result Value Ref Range    Troponin I <0.012  <=0.034 ng/mL   Influenza Antigen   Result Value Ref Range    Influenza A Ag, EIA Negative Negative    Influenza B Ag, EIA Negative Negative   BNP   Result Value Ref Range    proBNP 276.0 0.0 - 900.0 pg/mL   CK   Result Value Ref Range    Creatine Kinase 232 (H) 30 - 135 U/L   Comprehensive Metabolic Panel   Result Value Ref Range    Glucose 91 60 - 100 mg/dL    BUN 15 7 - 21 mg/dL    Creatinine 0.92 0.50 - 1.00 mg/dL    Sodium 137 137 - 145 mmol/L    Potassium 3.7 3.5 - 5.1 mmol/L    Chloride 98 95 - 110 mmol/L    CO2 25.0 22.0 - 31.0 mmol/L    Calcium 9.4 8.4 - 10.2 mg/dL    Total Protein 7.5 6.3 - 8.6 g/dL    Albumin 4.20 3.40 - 4.80 g/dL    ALT (SGPT) 26 9 - 52 U/L    AST (SGOT) 34 14 - 36 U/L    Alkaline Phosphatase 70 38 - 126 U/L    Total Bilirubin 0.6 0.2 - 1.3 mg/dL    eGFR Non African Amer 60 39 - 90 mL/min/1.73    Globulin 3.3 2.3 - 3.5 gm/dL    A/G Ratio 1.3 1.1 - 1.8 g/dL    BUN/Creatinine Ratio 16.3 7.0 - 25.0    Anion Gap 14.0 5.0 - 15.0 mmol/L     *Note: Due to a large number of results and/or encounters for the requested time period, some results have not been displayed. A complete set of results can be found in Results Review.

## 2018-03-30 ENCOUNTER — ANESTHESIA (OUTPATIENT)
Dept: GASTROENTEROLOGY | Facility: HOSPITAL | Age: 73
End: 2018-03-30

## 2018-03-30 ENCOUNTER — ANESTHESIA EVENT (OUTPATIENT)
Dept: GASTROENTEROLOGY | Facility: HOSPITAL | Age: 73
End: 2018-03-30

## 2018-03-30 ENCOUNTER — HOSPITAL ENCOUNTER (OUTPATIENT)
Facility: HOSPITAL | Age: 73
Setting detail: HOSPITAL OUTPATIENT SURGERY
Discharge: HOME | End: 2018-03-30
Attending: SURGERY | Admitting: SURGERY

## 2018-03-30 VITALS
BODY MASS INDEX: 26.82 KG/M2 | SYSTOLIC BLOOD PRESSURE: 100 MMHG | TEMPERATURE: 98.3 F | DIASTOLIC BLOOD PRESSURE: 58 MMHG | RESPIRATION RATE: 16 BRPM | OXYGEN SATURATION: 95 % | WEIGHT: 166.89 LBS | HEART RATE: 78 BPM | HEIGHT: 66 IN

## 2018-03-30 DIAGNOSIS — Z12.11 ENCOUNTER FOR SCREENING COLONOSCOPY: ICD-10-CM

## 2018-03-30 PROCEDURE — 25010000002 ONDANSETRON PER 1 MG: Performed by: NURSE ANESTHETIST, CERTIFIED REGISTERED

## 2018-03-30 PROCEDURE — 88305 TISSUE EXAM BY PATHOLOGIST: CPT | Performed by: PATHOLOGY

## 2018-03-30 PROCEDURE — 88305 TISSUE EXAM BY PATHOLOGIST: CPT | Performed by: SURGERY

## 2018-03-30 PROCEDURE — 45380 COLONOSCOPY AND BIOPSY: CPT | Performed by: SURGERY

## 2018-03-30 PROCEDURE — 25010000002 FENTANYL CITRATE (PF) 100 MCG/2ML SOLUTION: Performed by: NURSE ANESTHETIST, CERTIFIED REGISTERED

## 2018-03-30 PROCEDURE — 25010000002 PROPOFOL 10 MG/ML EMULSION: Performed by: NURSE ANESTHETIST, CERTIFIED REGISTERED

## 2018-03-30 PROCEDURE — 25010000002 MIDAZOLAM PER 1 MG: Performed by: NURSE ANESTHETIST, CERTIFIED REGISTERED

## 2018-03-30 RX ORDER — FENTANYL CITRATE 50 UG/ML
INJECTION, SOLUTION INTRAMUSCULAR; INTRAVENOUS AS NEEDED
Status: DISCONTINUED | OUTPATIENT
Start: 2018-03-30 | End: 2018-03-30 | Stop reason: SURG

## 2018-03-30 RX ORDER — ONDANSETRON 2 MG/ML
INJECTION INTRAMUSCULAR; INTRAVENOUS AS NEEDED
Status: DISCONTINUED | OUTPATIENT
Start: 2018-03-30 | End: 2018-03-30 | Stop reason: SURG

## 2018-03-30 RX ORDER — GLYCOPYRROLATE 0.2 MG/ML
INJECTION INTRAMUSCULAR; INTRAVENOUS AS NEEDED
Status: DISCONTINUED | OUTPATIENT
Start: 2018-03-30 | End: 2018-03-30 | Stop reason: SURG

## 2018-03-30 RX ORDER — DEXTROSE AND SODIUM CHLORIDE 5; .45 G/100ML; G/100ML
30 INJECTION, SOLUTION INTRAVENOUS CONTINUOUS PRN
Status: DISCONTINUED | OUTPATIENT
Start: 2018-03-30 | End: 2018-03-30 | Stop reason: HOSPADM

## 2018-03-30 RX ORDER — PROPOFOL 10 MG/ML
VIAL (ML) INTRAVENOUS AS NEEDED
Status: DISCONTINUED | OUTPATIENT
Start: 2018-03-30 | End: 2018-03-30 | Stop reason: SURG

## 2018-03-30 RX ORDER — MIDAZOLAM HYDROCHLORIDE 1 MG/ML
INJECTION INTRAMUSCULAR; INTRAVENOUS AS NEEDED
Status: DISCONTINUED | OUTPATIENT
Start: 2018-03-30 | End: 2018-03-30 | Stop reason: SURG

## 2018-03-30 RX ADMIN — PROPOFOL 20 MG: 10 INJECTION, EMULSION INTRAVENOUS at 11:15

## 2018-03-30 RX ADMIN — GLYCOPYRROLATE 0.2 MCG: 0.2 INJECTION, SOLUTION INTRAMUSCULAR; INTRAVENOUS at 11:18

## 2018-03-30 RX ADMIN — FENTANYL CITRATE 25 MCG: 50 INJECTION, SOLUTION INTRAMUSCULAR; INTRAVENOUS at 11:09

## 2018-03-30 RX ADMIN — DEXTROSE AND SODIUM CHLORIDE: 5; 450 INJECTION, SOLUTION INTRAVENOUS at 10:55

## 2018-03-30 RX ADMIN — PROPOFOL 20 MG: 10 INJECTION, EMULSION INTRAVENOUS at 11:20

## 2018-03-30 RX ADMIN — PROPOFOL 80 MG: 10 INJECTION, EMULSION INTRAVENOUS at 11:11

## 2018-03-30 RX ADMIN — MIDAZOLAM 1 MG: 1 INJECTION INTRAMUSCULAR; INTRAVENOUS at 11:09

## 2018-03-30 RX ADMIN — PROPOFOL 30 MG: 10 INJECTION, EMULSION INTRAVENOUS at 11:26

## 2018-03-30 RX ADMIN — DEXTROSE AND SODIUM CHLORIDE 30 ML/HR: 5; 450 INJECTION, SOLUTION INTRAVENOUS at 10:26

## 2018-03-30 RX ADMIN — FENTANYL CITRATE 25 MCG: 50 INJECTION, SOLUTION INTRAMUSCULAR; INTRAVENOUS at 11:20

## 2018-03-30 RX ADMIN — ONDANSETRON 4 MG: 2 INJECTION INTRAMUSCULAR; INTRAVENOUS at 11:13

## 2018-03-30 NOTE — ANESTHESIA PREPROCEDURE EVALUATION
Anesthesia Evaluation     history of anesthetic complications: PONV  NPO Solid Status: > 8 hours  NPO Liquid Status: > 8 hours           Airway   Mallampati: III  TM distance: <3 FB  Neck ROM: limited  Possible difficult intubation  Dental - normal exam     Pulmonary - negative pulmonary ROS    breath sounds clear to auscultation  Cardiovascular   Exercise tolerance: good (4-7 METS)    Rhythm: regular  Rate: normal    (+) hypertension,       Neuro/Psych- negative ROS  GI/Hepatic/Renal/Endo      Musculoskeletal     Abdominal    Substance History      OB/GYN          Other                      Anesthesia Plan    ASA 3     MAC     Anesthetic plan and risks discussed with patient.    Plan discussed with CRNA.

## 2018-03-30 NOTE — ANESTHESIA POSTPROCEDURE EVALUATION
Patient: Em Munoz    Procedure Summary     Date:  03/30/18 Room / Location:  [facility] ENDOSCOPY 2 / [facility]    Anesthesia Start:  1107 Anesthesia Stop:      Procedure:  COLONOSCOPY (N/A ) Diagnosis:       Encounter for screening colonoscopy      (Encounter for screening colonoscopy [Z12.11])    Surgeon:  Cristopher Godoy MD Provider:  Shereen Cerrato CRNA    Anesthesia Type:  MAC ASA Status:  3          Anesthesia Type: MAC  Last vitals  BP   145/79 (03/30/18 1016)   Temp   97 °F (36.1 °C) (03/30/18 1016)   Pulse   66 (03/30/18 1016)   Resp   16 (03/30/18 1016)     SpO2   99 % (03/30/18 1016)     Post Anesthesia Care and Evaluation    Patient location during evaluation: bedside  Patient participation: complete - patient participated  Level of consciousness: awake and alert  Pain score: 0  Pain management: adequate  Airway patency: patent  Anesthetic complications: No anesthetic complications  PONV Status: none  Cardiovascular status: hemodynamically stable  Respiratory status: spontaneous ventilation  Hydration status: acceptable

## 2018-04-02 LAB
LAB AP CASE REPORT: NORMAL
Lab: NORMAL
PATH REPORT.FINAL DX SPEC: NORMAL
PATH REPORT.GROSS SPEC: NORMAL

## 2018-05-01 ENCOUNTER — OFFICE VISIT (OUTPATIENT)
Dept: SURGERY | Facility: CLINIC | Age: 73
End: 2018-05-01

## 2018-05-01 VITALS
HEIGHT: 66 IN | BODY MASS INDEX: 27.32 KG/M2 | SYSTOLIC BLOOD PRESSURE: 124 MMHG | DIASTOLIC BLOOD PRESSURE: 68 MMHG | WEIGHT: 170 LBS

## 2018-05-01 DIAGNOSIS — Z86.010 HISTORY OF ADENOMATOUS POLYP OF COLON: Primary | ICD-10-CM

## 2018-05-01 DIAGNOSIS — K57.90 DIVERTICULOSIS OF INTESTINE WITHOUT BLEEDING, UNSPECIFIED INTESTINAL TRACT LOCATION: ICD-10-CM

## 2018-05-01 PROCEDURE — 99212 OFFICE O/P EST SF 10 MIN: CPT | Performed by: SURGERY

## 2018-05-01 NOTE — PATIENT INSTRUCTIONS

## 2018-05-24 ENCOUNTER — APPOINTMENT (OUTPATIENT)
Dept: CARDIOLOGY | Facility: HOSPITAL | Age: 73
End: 2018-05-24
Attending: INTERNAL MEDICINE

## 2018-05-24 ENCOUNTER — APPOINTMENT (OUTPATIENT)
Dept: CARDIOLOGY | Facility: HOSPITAL | Age: 73
End: 2018-05-24
Attending: FAMILY MEDICINE

## 2018-05-24 ENCOUNTER — HOSPITAL ENCOUNTER (OUTPATIENT)
Facility: HOSPITAL | Age: 73
Setting detail: OBSERVATION
Discharge: HOME | End: 2018-05-25
Attending: EMERGENCY MEDICINE | Admitting: FAMILY MEDICINE

## 2018-05-24 ENCOUNTER — APPOINTMENT (OUTPATIENT)
Dept: GENERAL RADIOLOGY | Facility: HOSPITAL | Age: 73
End: 2018-05-24

## 2018-05-24 DIAGNOSIS — R07.9 CHEST PAIN, RULE OUT ACUTE MYOCARDIAL INFARCTION: Primary | ICD-10-CM

## 2018-05-24 LAB
ALBUMIN SERPL-MCNC: 3.8 G/DL (ref 3.4–4.8)
ALBUMIN/GLOB SERPL: 1.4 G/DL (ref 1.1–1.8)
ALP SERPL-CCNC: 66 U/L (ref 38–126)
ALT SERPL W P-5'-P-CCNC: 32 U/L (ref 9–52)
ANION GAP SERPL CALCULATED.3IONS-SCNC: 8 MMOL/L (ref 5–15)
AST SERPL-CCNC: 31 U/L (ref 14–36)
BASOPHILS # BLD AUTO: 0.04 10*3/MM3 (ref 0–0.2)
BASOPHILS NFR BLD AUTO: 1 % (ref 0–2)
BILIRUB SERPL-MCNC: 0.4 MG/DL (ref 0.2–1.3)
BUN BLD-MCNC: 16 MG/DL (ref 7–21)
BUN/CREAT SERPL: 22.2 (ref 7–25)
CALCIUM SPEC-SCNC: 8.7 MG/DL (ref 8.4–10.2)
CHLORIDE SERPL-SCNC: 104 MMOL/L (ref 95–110)
CO2 SERPL-SCNC: 28 MMOL/L (ref 22–31)
CREAT BLD-MCNC: 0.72 MG/DL (ref 0.5–1)
DEPRECATED RDW RBC AUTO: 42 FL (ref 36.4–46.3)
EOSINOPHIL # BLD AUTO: 0.12 10*3/MM3 (ref 0–0.7)
EOSINOPHIL NFR BLD AUTO: 3 % (ref 0–7)
ERYTHROCYTE [DISTWIDTH] IN BLOOD BY AUTOMATED COUNT: 12.7 % (ref 11.5–14.5)
GFR SERPL CREATININE-BSD FRML MDRD: 80 ML/MIN/1.73 (ref 39–90)
GLOBULIN UR ELPH-MCNC: 2.8 GM/DL (ref 2.3–3.5)
GLUCOSE BLD-MCNC: 95 MG/DL (ref 60–100)
HBA1C MFR BLD: 5.8 % (ref 4–5.6)
HCT VFR BLD AUTO: 37 % (ref 35–45)
HGB BLD-MCNC: 12.2 G/DL (ref 12–15.5)
IMM GRANULOCYTES # BLD: 0.01 10*3/MM3 (ref 0–0.02)
IMM GRANULOCYTES NFR BLD: 0.3 % (ref 0–0.5)
LIPASE SERPL-CCNC: 49 U/L (ref 23–300)
LYMPHOCYTES # BLD AUTO: 0.99 10*3/MM3 (ref 0.6–4.2)
LYMPHOCYTES NFR BLD AUTO: 24.9 % (ref 10–50)
MCH RBC QN AUTO: 29.6 PG (ref 26.5–34)
MCHC RBC AUTO-ENTMCNC: 33 G/DL (ref 31.4–36)
MCV RBC AUTO: 89.8 FL (ref 80–98)
MONOCYTES # BLD AUTO: 0.34 10*3/MM3 (ref 0–0.9)
MONOCYTES NFR BLD AUTO: 8.6 % (ref 0–12)
NEUTROPHILS # BLD AUTO: 2.47 10*3/MM3 (ref 2–8.6)
NEUTROPHILS NFR BLD AUTO: 62.2 % (ref 37–80)
NT-PROBNP SERPL-MCNC: 53.8 PG/ML (ref 0–900)
PLATELET # BLD AUTO: 176 10*3/MM3 (ref 150–450)
PMV BLD AUTO: 10.7 FL (ref 8–12)
POTASSIUM BLD-SCNC: 3.9 MMOL/L (ref 3.5–5.1)
PROT SERPL-MCNC: 6.6 G/DL (ref 6.3–8.6)
RBC # BLD AUTO: 4.12 10*6/MM3 (ref 3.77–5.16)
SODIUM BLD-SCNC: 140 MMOL/L (ref 137–145)
TROPONIN I SERPL-MCNC: <0.012 NG/ML
WBC NRBC COR # BLD: 3.97 10*3/MM3 (ref 3.2–9.8)
WHOLE BLOOD HOLD SPECIMEN: NORMAL

## 2018-05-24 PROCEDURE — 93306 TTE W/DOPPLER COMPLETE: CPT

## 2018-05-24 PROCEDURE — 83880 ASSAY OF NATRIURETIC PEPTIDE: CPT | Performed by: EMERGENCY MEDICINE

## 2018-05-24 PROCEDURE — G0378 HOSPITAL OBSERVATION PER HR: HCPCS

## 2018-05-24 PROCEDURE — 85025 COMPLETE CBC W/AUTO DIFF WBC: CPT | Performed by: EMERGENCY MEDICINE

## 2018-05-24 PROCEDURE — 99284 EMERGENCY DEPT VISIT MOD MDM: CPT

## 2018-05-24 PROCEDURE — 83690 ASSAY OF LIPASE: CPT | Performed by: EMERGENCY MEDICINE

## 2018-05-24 PROCEDURE — 80053 COMPREHEN METABOLIC PANEL: CPT | Performed by: EMERGENCY MEDICINE

## 2018-05-24 PROCEDURE — 93010 ELECTROCARDIOGRAM REPORT: CPT | Performed by: INTERNAL MEDICINE

## 2018-05-24 PROCEDURE — 99214 OFFICE O/P EST MOD 30 MIN: CPT | Performed by: NURSE PRACTITIONER

## 2018-05-24 PROCEDURE — 71046 X-RAY EXAM CHEST 2 VIEWS: CPT

## 2018-05-24 PROCEDURE — 83036 HEMOGLOBIN GLYCOSYLATED A1C: CPT | Performed by: FAMILY MEDICINE

## 2018-05-24 PROCEDURE — 93306 TTE W/DOPPLER COMPLETE: CPT | Performed by: INTERNAL MEDICINE

## 2018-05-24 PROCEDURE — 84484 ASSAY OF TROPONIN QUANT: CPT | Performed by: EMERGENCY MEDICINE

## 2018-05-24 PROCEDURE — 93005 ELECTROCARDIOGRAM TRACING: CPT | Performed by: EMERGENCY MEDICINE

## 2018-05-24 RX ORDER — ALBUTEROL SULFATE 2.5 MG/3ML
2.5 SOLUTION RESPIRATORY (INHALATION) EVERY 6 HOURS PRN
Status: DISCONTINUED | OUTPATIENT
Start: 2018-05-24 | End: 2018-05-25 | Stop reason: HOSPADM

## 2018-05-24 RX ORDER — ACETAMINOPHEN 325 MG/1
650 TABLET ORAL EVERY 4 HOURS PRN
Status: DISCONTINUED | OUTPATIENT
Start: 2018-05-24 | End: 2018-05-25 | Stop reason: HOSPADM

## 2018-05-24 RX ORDER — TEMAZEPAM 15 MG/1
30 CAPSULE ORAL NIGHTLY PRN
Status: DISCONTINUED | OUTPATIENT
Start: 2018-05-24 | End: 2018-05-25 | Stop reason: HOSPADM

## 2018-05-24 RX ORDER — PANTOPRAZOLE SODIUM 40 MG/1
40 TABLET, DELAYED RELEASE ORAL EVERY MORNING
Status: DISCONTINUED | OUTPATIENT
Start: 2018-05-25 | End: 2018-05-25 | Stop reason: HOSPADM

## 2018-05-24 RX ORDER — SODIUM CHLORIDE 0.9 % (FLUSH) 0.9 %
1-10 SYRINGE (ML) INJECTION AS NEEDED
Status: DISCONTINUED | OUTPATIENT
Start: 2018-05-24 | End: 2018-05-25 | Stop reason: HOSPADM

## 2018-05-24 RX ORDER — ONDANSETRON 2 MG/ML
4 INJECTION INTRAMUSCULAR; INTRAVENOUS EVERY 6 HOURS PRN
Status: DISCONTINUED | OUTPATIENT
Start: 2018-05-24 | End: 2018-05-25 | Stop reason: HOSPADM

## 2018-05-24 RX ORDER — ASPIRIN 81 MG/1
81 TABLET ORAL DAILY
Status: DISCONTINUED | OUTPATIENT
Start: 2018-05-25 | End: 2018-05-25 | Stop reason: HOSPADM

## 2018-05-24 RX ORDER — CITALOPRAM 10 MG/1
10 TABLET ORAL DAILY
Status: DISCONTINUED | OUTPATIENT
Start: 2018-05-24 | End: 2018-05-25 | Stop reason: HOSPADM

## 2018-05-24 RX ORDER — LOSARTAN POTASSIUM 50 MG/1
50 TABLET ORAL DAILY
Status: DISCONTINUED | OUTPATIENT
Start: 2018-05-24 | End: 2018-05-25 | Stop reason: HOSPADM

## 2018-05-24 RX ORDER — ASPIRIN 325 MG
325 TABLET ORAL ONCE
Status: COMPLETED | OUTPATIENT
Start: 2018-05-24 | End: 2018-05-24

## 2018-05-24 RX ORDER — NADOLOL 20 MG/1
20 TABLET ORAL DAILY
Status: DISCONTINUED | OUTPATIENT
Start: 2018-05-24 | End: 2018-05-24

## 2018-05-24 RX ORDER — ALUMINA, MAGNESIA, AND SIMETHICONE 2400; 2400; 240 MG/30ML; MG/30ML; MG/30ML
15 SUSPENSION ORAL ONCE
Status: COMPLETED | OUTPATIENT
Start: 2018-05-24 | End: 2018-05-24

## 2018-05-24 RX ADMIN — LIDOCAINE HYDROCHLORIDE 15 ML: 20 SOLUTION ORAL; TOPICAL at 11:58

## 2018-05-24 RX ADMIN — TEMAZEPAM 30 MG: 15 CAPSULE ORAL at 20:37

## 2018-05-24 RX ADMIN — NITROGLYCERIN 0.5 INCH: 20 OINTMENT TOPICAL at 11:58

## 2018-05-24 RX ADMIN — ALUMINUM HYDROXIDE, MAGNESIUM HYDROXIDE, AND DIMETHICONE 15 ML: 400; 400; 40 SUSPENSION ORAL at 11:58

## 2018-05-24 RX ADMIN — ASPIRIN 325 MG: 325 TABLET ORAL at 11:57

## 2018-05-24 NOTE — H&P
[facility] Medicine Admission      Date of Admission: 2018      Primary Care Physician: Lucio Villegas MD      Chief Complaint:  Chest pain, dyspnea     HPI:  This 72-year-old  female with a history significant for hypertension, hyperlipidemia, and family history of coronary artery disease reported to the emergency department when she began having sharp substernal chest pain.  Patient also reports that while she was walking back to her house she began to experience dyspnea upon exertion and nausea.  States she has never had any previous symptoms.  No personal history, but her brother  of a myocardial infarction at the age of 55 and her father suffered from congestive heart failure.  Patient denied dizziness, syncope, presyncope.  Denies any recent fevers, chills, or any other illnesses.  Patient states that her pain was relieved by nitroglycerin and aspirin and that now it is essentially pressure on the left side of her chest.  No radiation.  Have discussed case with Dr. Rose of cardiology who has agreed to see the patient.  Consult appreciated.    Concurrent Medical History:  has a past medical history of Cancer; Disease of thyroid gland; Esophageal stricture; GERD (gastroesophageal reflux disease); Hiatal hernia; Hyperlipidemia; Hypertension; and Pneumonia.    Past Surgical History:  has a past surgical history that includes Cataract extraction, bilateral; Breast surgery; Mastectomy; Mastectomy (Right); Appendectomy; Cholecystectomy; and Colonoscopy (N/A, 3/30/2018).    Family History: family history includes Abnormal EKG in her son; COPD in her mother; Diabetes in her father, sister, and sister; Heart attack in her brother, brother, and father; Hypertension in her brother, sister, and sister; Stroke in her brother and brother.     Social History:  reports that she has never smoked. She has never used smokeless tobacco. She reports that she does  not drink alcohol or use drugs.    Allergies:   Allergies   Allergen Reactions   • Atorvastatin Other (See Comments)     Muscle pain   • Penicillins    • Penicillins Rash       Medications:     Prior to Admission medications    Medication Sig Start Date End Date Taking? Authorizing Provider   levothyroxine sodium (TIROSINT) 75 MCG capsule Take 75 mcg by mouth Daily.   Yes Historical Provider, MD   losartan (COZAAR) 50 MG tablet Take 50 mg by mouth Daily.   Yes Historical Provider, MD   Omega-3 Fatty Acids (OMEGA-3 EPA FISH OIL PO) Take 600 mg by mouth Daily.   Yes Historical Provider, MD   omeprazole (priLOSEC) 20 MG capsule Take 20 mg by mouth Daily.   Yes Historical Provider, MD   temazepam (RESTORIL) 30 MG capsule Take 30 mg by mouth At Night As Needed for sleep.   Yes Historical Provider, MD   citalopram (CeleXA) 10 MG tablet Take 10 mg by mouth Daily.  5/24/18 Yes Historical Provider, MD   levothyroxine (SYNTHROID, LEVOTHROID) 125 MCG tablet Take 125 mcg by mouth Daily.  5/24/18 Yes Historical Provider, MD   citalopram (CeleXA) 20 MG tablet Take 20 mg by mouth Daily. 1/2 tab daily    Historical Provider, MD   albuterol (PROVENTIL HFA;VENTOLIN HFA) 108 (90 BASE) MCG/ACT inhaler Inhale 2 puffs Every 4 (Four) Hours As Needed for wheezing. 2/22/17 5/24/18  Kamran Ramirez MD   nadolol (CORGARD) 20 MG tablet Take 20 mg by mouth Daily.  5/24/18  Historical Provider, MD     Review of Systems:  Review of Systems   Constitutional: Negative for chills and fever.   Respiratory: Positive for shortness of breath. Negative for wheezing.    Cardiovascular: Positive for chest pain. Negative for palpitations and leg swelling.   Gastrointestinal: Positive for nausea. Negative for abdominal pain and vomiting.   Genitourinary: Negative for decreased urine volume, dysuria and hematuria.   Neurological: Negative for dizziness, syncope and light-headedness.      Otherwise complete ROS is negative except as mentioned  above.    Physical Exam:   Temp:  [98.6 °F (37 °C)] 98.6 °F (37 °C)  Heart Rate:  [64-75] 64  Resp:  [19] 19  BP: (150-157)/(68-79) 150/68  Physical Exam   Constitutional: She is oriented to person, place, and time. She appears well-developed and well-nourished. No distress.   HENT:   Head: Normocephalic and atraumatic.   Cardiovascular: Normal rate and regular rhythm.    Pulmonary/Chest: Effort normal. No respiratory distress. She has no wheezes.   Abdominal: Soft. Bowel sounds are normal. She exhibits no distension. There is no tenderness.   Neurological: She is alert and oriented to person, place, and time.   Skin: Skin is warm and dry. Capillary refill takes less than 2 seconds. She is not diaphoretic.   Psychiatric: She has a normal mood and affect. Her behavior is normal.     Results Reviewed:  I have personally reviewed current lab, radiology, and data and agree with results.  Lab Results (last 24 hours)     Procedure Component Value Units Date/Time    Extra Tubes [[id number]] Collected:  05/24/18 1122    Specimen:  Blood from Blood, Venous Line Updated:  05/24/18 1231    Narrative:       The following orders were created for panel order Extra Tubes.  Procedure                               Abnormality         Status                     ---------                               -----------         ------                     Light Blue Top[[id number]]                                   Final result                 Please view results for these tests on the individual orders.    Light Blue Top [[id number]] Collected:  05/24/18 1122    Specimen:  Blood Updated:  05/24/18 1231     Extra Tube hold for add-on     Comment: Auto resulted       BNP [[id number]] Collected:  05/24/18 1119    Specimen:  Blood Updated:  05/24/18 1207    Troponin [[id number]]  (Normal) Collected:  05/24/18 1119    Specimen:  Blood Updated:  05/24/18 1147     Troponin I <0.012 ng/mL     Lipase [[id number]]  (Normal) Collected:  05/24/18 1119     Specimen:  Blood Updated:  05/24/18 1135     Lipase 49 U/L     Comprehensive Metabolic Panel [[id number]]  (Normal) Collected:  05/24/18 1119    Specimen:  Blood Updated:  05/24/18 1135     Glucose 95 mg/dL      BUN 16 mg/dL      Creatinine 0.72 mg/dL      Sodium 140 mmol/L      Potassium 3.9 mmol/L      Chloride 104 mmol/L      CO2 28.0 mmol/L      Calcium 8.7 mg/dL      Total Protein 6.6 g/dL      Albumin 3.80 g/dL      ALT (SGPT) 32 U/L      AST (SGOT) 31 U/L      Alkaline Phosphatase 66 U/L      Total Bilirubin 0.4 mg/dL      eGFR Non African Amer 80 mL/min/1.73      Globulin 2.8 gm/dL      A/G Ratio 1.4 g/dL      BUN/Creatinine Ratio 22.2     Anion Gap 8.0 mmol/L     Narrative:       The MDRD GFR formula is only valid for adults with stable renal function between ages 18 and 70.    CBC & Differential [[id number]] Collected:  05/24/18 1119    Specimen:  Blood Updated:  05/24/18 1125    Narrative:       The following orders were created for panel order CBC & Differential.  Procedure                               Abnormality         Status                     ---------                               -----------         ------                     CBC Auto Differential[[id number]]        Normal              Final result                 Please view results for these tests on the individual orders.    CBC Auto Differential [[id number]]  (Normal) Collected:  05/24/18 1119    Specimen:  Blood Updated:  05/24/18 1125     WBC 3.97 10*3/mm3      RBC 4.12 10*6/mm3      Hemoglobin 12.2 g/dL      Hematocrit 37.0 %      MCV 89.8 fL      MCH 29.6 pg      MCHC 33.0 g/dL      RDW 12.7 %      RDW-SD 42.0 fl      MPV 10.7 fL      Platelets 176 10*3/mm3      Neutrophil % 62.2 %      Lymphocyte % 24.9 %      Monocyte % 8.6 %      Eosinophil % 3.0 %      Basophil % 1.0 %      Immature Grans % 0.3 %      Neutrophils, Absolute 2.47 10*3/mm3      Lymphocytes, Absolute 0.99 10*3/mm3      Monocytes, Absolute 0.34 10*3/mm3      Eosinophils,  Absolute 0.12 10*3/mm3      Basophils, Absolute 0.04 10*3/mm3      Immature Grans, Absolute 0.01 10*3/mm3         Imaging Results (last 24 hours)     Procedure Component Value Units Date/Time    XR Chest 2 View [[id number]] Collected:  05/24/18 1118     Updated:  05/24/18 1156    Narrative:         EXAM:          Radiograph(s), Chest   VIEWS:    PA / Lat ; 2       DATE/TIME:  5/24/2018 11:53 AM CDT                INDICATION:   chest pain    COMPARISON:  CXR: 5/25/17             FINDINGS:             - lines/tubes:    none     - cardiac:         size within normal limits         - mediastinum: contour within normal limits         - lungs:         no focal air space process, pulmonary  interstitial edema, nodule(s)/mass             - pleura:         no evidence of  fluid                  - osseous:         unremarkable for age                  - misc.:         Impression:       CONCLUSION:        1. No evidence of an active cardiopulmonary process.                                                Electronically signed by:  GALILEA Cummins MD  5/24/2018 11:55  AM CDT Workstation: [id number]            Assessment:    Hospital Problem List     Chest pain, rule out acute myocardial infarction      Hypertension  Hyperlipidemia  Thyroid disorder        Plan:  Cardiology consult appreciated, continue trend cardiac enzymes, echocardiogram, lipid panel, continue to treat as hospital course dictates.          This document has been electronically signed by ANTONIO Ortiz on May 24, 2018 2:56 PM

## 2018-05-24 NOTE — ED PROVIDER NOTES
Subjective   72 years old female with history of hypertension, hyperlipidemia presented in the ER with a chief complaint of substernal chest pain.  Patient report chest pain started while she was tilling her garden for almost one hour which involved a bit exertion.  It was sharp initially, moderate intensity, started to improve after taking rest and 81 mg aspirin but still have discomfort/pressure-like sensation in the center of the chest.  Minimal shortness of breath momentarily which is gone now.  No palpitations.        History provided by:  Patient  Chest Pain   Pain location:  Substernal area  Pain quality: dull and sharp    Pain radiates to:  Does not radiate  Pain severity:  Moderate  Onset quality:  Sudden  Timing:  Constant  Progression:  Improving  Chronicity:  New  Context comment:  While tilling   Relieved by:  Nothing  Worsened by:  Nothing  Associated symptoms: nausea    Associated symptoms: no abdominal pain, no anorexia, no anxiety, no back pain, no claudication, no cough, no diaphoresis, no dizziness, no dysphagia, no fever, no heartburn, no numbness, no palpitations, no shortness of breath and no vomiting    Risk factors: high cholesterol and hypertension        Review of Systems   Constitutional: Negative for activity change, diaphoresis and fever.   HENT: Negative for congestion, nosebleeds, postnasal drip, sore throat and trouble swallowing.    Eyes: Negative for redness.   Respiratory: Positive for chest tightness. Negative for cough and shortness of breath.    Cardiovascular: Positive for chest pain. Negative for palpitations and claudication.   Gastrointestinal: Positive for nausea. Negative for abdominal pain, anorexia, heartburn and vomiting.   Genitourinary: Negative for flank pain.   Musculoskeletal: Negative for back pain.   Skin: Negative for color change.   Neurological: Negative for dizziness, syncope and numbness.   Psychiatric/Behavioral: Negative for agitation.       Past Medical  History:   Diagnosis Date   • Cancer     breast   • Disease of thyroid gland    • Esophageal stricture    • GERD (gastroesophageal reflux disease)    • Hiatal hernia    • Hyperlipidemia    • Hypertension    • Pneumonia        Allergies   Allergen Reactions   • Atorvastatin Other (See Comments)     Muscle pain   • Penicillins    • Penicillins Rash       Past Surgical History:   Procedure Laterality Date   • APPENDECTOMY     • BREAST SURGERY     • CATARACT EXTRACTION, BILATERAL     • CHOLECYSTECTOMY     • COLONOSCOPY N/A 3/30/2018    Procedure: COLONOSCOPY;  Surgeon: Cristopher Godoy MD;  Location: [facility] ENDOSCOPY;  Service: Gastroenterology   • MASTECTOMY     • MASTECTOMY Right        Family History   Problem Relation Age of Onset   • Heart attack Father    • Diabetes Father    • Hypertension Sister    • Diabetes Sister    • Heart attack Brother    • Hypertension Brother    • Stroke Brother    • Hypertension Sister    • COPD Mother    • Diabetes Sister    • Stroke Brother    • Heart attack Brother    • Abnormal EKG Son        Social History     Social History   • Marital status:      Social History Main Topics   • Smoking status: Never Smoker   • Smokeless tobacco: Never Used   • Alcohol use No   • Drug use: No   • Sexual activity: Defer     Other Topics Concern   • Not on file     Social History Narrative    ** Merged History Encounter **                Objective   Physical Exam   Constitutional: She is oriented to person, place, and time. She appears well-developed and well-nourished.   HENT:   Head: Normocephalic and atraumatic.   Nose: Nose normal.   Mouth/Throat: Oropharynx is clear and moist.   Eyes: Conjunctivae are normal.   Neck: Normal range of motion. Neck supple.   Cardiovascular: Normal rate, regular rhythm and normal heart sounds.    Pulmonary/Chest: Effort normal and breath sounds normal. No respiratory distress. She has no wheezes.   Abdominal: Soft. She exhibits no distension. There is no  tenderness. There is no guarding.   Musculoskeletal: Normal range of motion.   Neurological: She is alert and oriented to person, place, and time. No cranial nerve deficit. She exhibits normal muscle tone.   Skin: Skin is warm. Capillary refill takes less than 2 seconds.   Psychiatric: She has a normal mood and affect.   Nursing note and vitals reviewed.      ECG 12 Lead    Date/Time: 5/24/2018 10:45 AM  Performed by: JETT STAPLES  Authorized by: JETT STAPLES   Interpreted by physician  Rhythm: sinus rhythm  Rate: normal  BPM: 70  QRS axis: normal  Conduction: conduction normal  ST Segments: ST segments normal  T Waves: T waves normal  Other: no other findings  Clinical impression: normal ECG                 ED Course                  MDM  Number of Diagnoses or Management Options  Chest pain, rule out acute myocardial infarction:   Diagnosis management comments: 72 years old is evaluated for chest pain which started with exertional activity.  Her sharp pain is improved but still have some discomfort.  She is given aspirin and nitroglycerin but despite that she still have discomfort.  She has negative initial troponin.  Chest x-ray negative for any acute cardiopulmonary findings.  Unremarkable chemistry profile.  I have discussed with Dr. Aguero hospitalist on call and patient is being admitted for rule out.       Amount and/or Complexity of Data Reviewed  Clinical lab tests: ordered and reviewed  Tests in the radiology section of CPT®: ordered and reviewed  Discuss the patient with other providers: yes      Labs Reviewed   COMPREHENSIVE METABOLIC PANEL - Normal    Narrative:     The MDRD GFR formula is only valid for adults with stable renal function between ages 18 and 70.   LIPASE - Normal   TROPONIN (IN-HOUSE) - Normal   CBC WITH AUTO DIFFERENTIAL - Normal   BNP (IN-HOUSE)   CBC AND DIFFERENTIAL    Narrative:     The following orders were created for panel order CBC & Differential.  Procedure                                Abnormality         Status                     ---------                               -----------         ------                     CBC Auto Differential[[id number]]        Normal              Final result                 Please view results for these tests on the individual orders.   EXTRA TUBES    Narrative:     The following orders were created for panel order Extra Tubes.  Procedure                               Abnormality         Status                     ---------                               -----------         ------                     Light Blue Top[[id number]]                                   Final result                 Please view results for these tests on the individual orders.   LIGHT BLUE TOP       Xr Chest 2 View    Result Date: 5/24/2018  Narrative: EXAM:          Radiograph(s), Chest VIEWS:    PA / Lat ; 2     DATE/TIME:  5/24/2018 11:53 AM CDT            INDICATION:   chest pain  COMPARISON:  CXR: 5/25/17         FINDINGS:         - lines/tubes:    none   - cardiac:         size within normal limits       - mediastinum: contour within normal limits       - lungs:         no focal air space process, pulmonary interstitial edema, nodule(s)/mass           - pleura:         no evidence of  fluid                - osseous:         unremarkable for age                - misc.:        Impression: CONCLUSION:    1. No evidence of an active cardiopulmonary process.                                  Electronically signed by:  GALILEA Cummins MD  5/24/2018 11:55 AM CDT Workstation: [id number]          Final diagnoses:   Chest pain, rule out acute myocardial infarction            Tristin Paulson MD  05/24/18 6510

## 2018-05-24 NOTE — CONSULTS
"    [facility] Cardiology Consult    Referring Provider: Dr LYLY López    Reason for Consultation: Chest pain  Cardiologist: Dr Rose    Patient Care Team:  Lucio Villegas MD as PCP - General (Family Medicine)  Lucio Villegas MD (Family Medicine)   LOS: 0 days     Subjective .     History of present illness:    The patient is a 72-year-old female who presented to [facility] emergency department at approximately 11 AM secondary to concerns for chest pain.  The patient reports that at approximately 9 AM she went outside to till her garden and approximately one hour later (10 AM) she had onset of substernal chest pain.  She indicates that she return to her house (which [place] approximately one [place] city block) and experienced associated dyspnea as well as nausea without vomiting.  She is indicating that it took her family members \"a little while\" to get ready to bring her to the hospital and during that period of time the chest pain subsided.  However, in further discussion there does appear to be a pattern of intermittent chest pressure.    She reports concurrent medical history to be inclusive of hypertension, hyperlipidemia(intolerant to prior statins secondary to myalgia), history of right breast cancer for which she has undergone mastectomy, as well as chemotherapy and radiation approximately 10-11 years prior and hypothyroidism.  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  She denies any prior cardiac workup; brother  at approximate age 55 secondary to myocardial infarction.   There is negative history of personal or secondhand tobacco use nor use of alcoholic beverages.    Chest Pain:  Location: Substernal  Duration: Intermittent now for approximately 6-7 hours  Quality: Sharp type sensation; pressure  Intensity: Moderate  Precipitating factors: Exertional: [place]  Aggravating factors: " Exertion  Alleviating factors: Rest plus minus sublingual nitroglycerin  Associated symptoms: Shortness of breath, nausea without vomiting    Review of Systems   Constitutional: Negative for activity change, appetite change, chills and fever.   HENT: Negative.    Eyes: Negative.    Respiratory: Positive for chest tightness. Negative for apnea, cough, choking, wheezing and stridor. Shortness of breath: now at this time.    Cardiovascular: Negative for palpitations and leg swelling. Chest pain: as presenting.   Gastrointestinal: Negative for abdominal distention, abdominal pain and constipation.   Endocrine: Negative for polydipsia, polyphagia and polyuria.   Genitourinary: Negative.    Musculoskeletal: Negative.    Skin: Negative for pallor, rash and wound.   Allergic/Immunologic: Negative.    Neurological: Negative for dizziness, syncope and light-headedness.   Hematological: Does not bruise/bleed easily.   Psychiatric/Behavioral: Negative for agitation, behavioral problems, confusion and suicidal ideas. The patient is not nervous/anxious and is not hyperactive.      History  Past Medical History:   Diagnosis Date   • Cancer     breast   • Disease of thyroid gland    • Esophageal stricture    • GERD (gastroesophageal reflux disease)    • Hiatal hernia    • Hyperlipidemia    • Hypertension    • Pneumonia    ,   Past Surgical History:   Procedure Laterality Date   • APPENDECTOMY     • BREAST SURGERY     • CATARACT EXTRACTION, BILATERAL     • CHOLECYSTECTOMY     • COLONOSCOPY N/A 3/30/2018    Procedure: COLONOSCOPY;  Surgeon: Cristopher Godoy MD;  Location: [facility] ENDOSCOPY;  Service: Gastroenterology   • MASTECTOMY     • MASTECTOMY Right    ,   Family History   Problem Relation Age of Onset   • Heart attack Father    • Diabetes Father    • Hypertension Sister    • Diabetes Sister    • Heart attack Brother    • Hypertension Brother    • Stroke Brother    • Hypertension Sister    • COPD Mother    • Diabetes Sister    •  "Stroke Brother    • Heart attack Brother    • Abnormal EKG Son    ,   Social History   Substance Use Topics   • Smoking status: Never Smoker   • Smokeless tobacco: Never Used   • Alcohol use No   ,  and Allergies:  Atorvastatin; Penicillins; and Penicillins    Objective     Dietary Orders     Start     Ordered    05/24/18 1432  Diet Regular; Cardiac  Diet Effective Now     Question Answer Comment   Diet Texture / Consistency Regular    Common Modifiers Cardiac        05/24/18 1431        Respiratory:  No Data Recorded   Weight  Min: 77 kg (169 lb 12.8 oz)  Max: 78.1 kg (172 lb 3.2 oz)     Vital Sign Min/Max for last 24 hours  Temp  Min: 96.5 °F (35.8 °C)  Max: 98.6 °F (37 °C)   BP  Min: 115/61  Max: 157/79   Pulse  Min: 54  Max: 75   Resp  Min: 18  Max: 19   SpO2  Min: 92 %  Max: 97 %   No Data Recorded   Weight  Min: 77 kg (169 lb 12.8 oz)  Max: 78.1 kg (172 lb 3.2 oz)     Flowsheet Rows      First Filed Value   Admission Height  167.6 cm (66\") Documented at 05/24/2018 1040   Admission Weight  78.1 kg (172 lb 3.2 oz) Documented at 05/24/2018 1040        1    05/24/18  1040 05/24/18  1417   Weight: 78.1 kg (172 lb 3.2 oz) 77 kg (169 lb 12.8 oz)     No intake/output data recorded.    Physical Exam   Constitutional: She is oriented to person, place, and time. She appears well-developed and well-nourished. No distress.   HENT:   Head: Normocephalic and atraumatic.   Eyes: Right eye exhibits no discharge. Left eye exhibits no discharge. No scleral icterus.   Neck: Neck supple. No JVD present.   Cardiovascular: Normal rate, regular rhythm, normal heart sounds and intact distal pulses.  Exam reveals no gallop and no friction rub.    No murmur heard.  Pulmonary/Chest: Effort normal and breath sounds normal. No stridor. No respiratory distress. She has no wheezes. She has no rales. She exhibits no tenderness.   Abdominal: Soft. She exhibits no distension. There is no tenderness.   Musculoskeletal: She exhibits no edema or " tenderness.   Neurological: She is alert and oriented to person, place, and time.   Skin: Skin is warm and dry. Capillary refill takes less than 2 seconds. No rash noted. She is not diaphoretic.   Psychiatric: She has a normal mood and affect. Her behavior is normal. Judgment and thought content normal.   Vitals reviewed.    96.5 °F (35.8 °C) (Temporal Artery ) 54 115/61 18 94% 77 kg (169 lb 12.8 oz) Body mass index is 27.42 kg/m².    Central Lines/PICC: absent         Current Facility-Administered Medications:   •  acetaminophen (TYLENOL) tablet 650 mg, 650 mg, Oral, Q4H PRN, Blue López MD  •  albuterol (PROVENTIL) nebulizer solution 0.083% 2.5 mg/3mL, 2.5 mg, Nebulization, Q6H PRN, Blue López MD  •  [START ON 5/25/2018] aspirin EC tablet 81 mg, 81 mg, Oral, Daily, Blue López MD  •  citalopram (CeleXA) tablet 10 mg, 10 mg, Oral, Daily, Blue López MD  •  losartan (COZAAR) tablet 50 mg, 50 mg, Oral, Daily, Blue López MD  •  nadolol (CORGARD) tablet 20 mg, 20 mg, Oral, Daily, Blue López MD  •  nitroglycerin (NITROSTAT) ointment 1 inch, 1 inch, Topical, Q6H PRN, Blue López MD  •  ondansetron (ZOFRAN) injection 4 mg, 4 mg, Intravenous, Q6H PRN, Blue López MD  •  [START ON 5/25/2018] pantoprazole (PROTONIX) EC tablet 40 mg, 40 mg, Oral, QAM, Blue López MD  •  sodium chloride 0.9 % flush 1-10 mL, 1-10 mL, Intravenous, PRN, Blue López MD  •  temazepam (RESTORIL) capsule 30 mg, 30 mg, Oral, Nightly PRN, Blue López MD    Infusions:     Prescriptions Prior to Admission   Medication Sig Dispense Refill Last Dose   • levothyroxine sodium (TIROSINT) 75 MCG capsule Take 75 mcg by mouth Daily.   5/24/2018 at 0700   • losartan (COZAAR) 50 MG tablet Take 50 mg by mouth Daily.   5/24/2018 at 0700   • Omega-3 Fatty Acids (OMEGA-3 EPA FISH OIL PO) Take 600 mg by mouth Daily.   5/24/2018 at 0700   • omeprazole (priLOSEC) 20 MG capsule Take 20 mg by mouth Daily.   5/24/2018 at 0700   • temazepam  (RESTORIL) 30 MG capsule Take 30 mg by mouth At Night As Needed for sleep.   5/23/2018 at 2100   • citalopram (CeleXA) 20 MG tablet Take 20 mg by mouth Daily. 1/2 tab daily   5/24/2018 at 0800     Data Reviewed:    Electrocardiogram:  ECG/EMG Results (last 24 hours)     Procedure Component Value Units Date/Time    ECG 12 Lead [[id number]] Collected:  05/24/18 1037     Updated:  05/24/18 1243    SCANNED EKG [[id number]] Resulted:  05/24/18      Updated:  05/24/18 1259          Xr Chest 2 View    Result Date: 5/24/2018  CONCLUSION:    1. No evidence of an active cardiopulmonary process.                                  Electronically signed by:  GALILEA Cummins MD  5/24/2018 11:55 AM CDT Workstation: [id number]      Results from last 7 days  Lab Units 05/24/18  1119   SODIUM mmol/L 140   POTASSIUM mmol/L 3.9   CHLORIDE mmol/L 104   CO2 mmol/L 28.0   BUN mg/dL 16   CREATININE mg/dL 0.72   CALCIUM mg/dL 8.7   BILIRUBIN mg/dL 0.4   ALK PHOS U/L 66   ALT (SGPT) U/L 32   AST (SGOT) U/L 31   GLUCOSE mg/dL 95     Estimated Creatinine Clearance: 67.6 mL/min (by C-G formula based on SCr of 0.72 mg/dL).    Results from last 7 days  Lab Units 05/24/18  1119   WBC 10*3/mm3 3.97   HEMOGLOBIN g/dL 12.2   PLATELETS 10*3/mm3 176       Lab Results   Component Value Date    CKTOTAL 72 05/25/2017    CKMB 0.76 05/25/2017    TROPONINI <0.012 05/24/2018     Lab Results   Component Value Date    PROBNP 53.8 05/24/2018     The following portions of the patient's history were reviewed and updated as appropriate: allergies, current medications, problem list,  past medical history, surgical history, family history and social history.    Recent images independently reviewed.    Available laboratory values reviewed.    Assessment/Plan     Active Problems:    Chest pain, rule out acute myocardial infarction    The patient presents with chest discomfort in the setting of high probability for coronary artery disease based on retest risk factor  analysis.    I discussed the patients findings and my recommendations with patient     Following discussion, the tentative plan will be that if continuation of findings consistent with negative troponins and normal EKG we will proceed with coronary CTA on 05/25/2018.  As her heart rate is with findings of sinus bradycardia approximate 56 bpm upon today's evaluation, I will not place when necessary orders for beta blocker therapy regarding procedure at this time.  Should troponins found to be indicative of myocardial injury, Dr. Rose will evaluate and plan accordingly.  Additional plans noted for laboratory diagnostics in the a.m. 05/25/2018 regarding CBC, BMP, lipid panel, TSH and serial troponins.  Electrocardiogram is also ordered for follow-up 05/25/2018 as well as when necessary.  Transthoracic echocardiogram to be performed and will be reviewed per Dr. Rose.    Medications at this time:  Aspirin 325 (emergency department 05/24/2018)  Continuation aspirin 81 mg daily  Corgard 20 mg daily (Patient indicates that she no longer takes this medication; perhaps related to bradycardia) thus will hold for now  Losartan 50 mg daily  Nitropaste 0.5 inches (emergency department 05/24/2018)  Celexa 10 mg daily  Protonix 40 mg daily    Thank you for allowing me to participate in the evaluation and management of this patient.        Taisha Webber, CATE  05/24/18  3:21 PM

## 2018-05-24 NOTE — PLAN OF CARE
Problem: Patient Care Overview  Goal: Plan of Care Review  Outcome: Ongoing (interventions implemented as appropriate)   05/24/18 1810   Coping/Psychosocial   Plan of Care Reviewed With patient   Plan of Care Review   Progress no change   OTHER   Outcome Summary Patient admitted today, had an echo and will be getting a CTA of coronaries in the morning.      Goal: Individualization and Mutuality  Outcome: Ongoing (interventions implemented as appropriate)    Goal: Discharge Needs Assessment  Outcome: Ongoing (interventions implemented as appropriate)      Problem: Cardiac: ACS (Acute Coronary Syndrome) (Adult)  Goal: Signs and Symptoms of Listed Potential Problems Will be Absent, Minimized or Managed (Cardiac: ACS)  Outcome: Ongoing (interventions implemented as appropriate)   05/24/18 1810   Goal/Outcome Evaluation   Problems Assessed (Acute Coronary Syndrome) all   Problems Present (Acute Coronary Syn) none

## 2018-05-25 ENCOUNTER — APPOINTMENT (OUTPATIENT)
Dept: CT IMAGING | Facility: HOSPITAL | Age: 73
End: 2018-05-25

## 2018-05-25 VITALS
HEART RATE: 84 BPM | SYSTOLIC BLOOD PRESSURE: 125 MMHG | DIASTOLIC BLOOD PRESSURE: 66 MMHG | BODY MASS INDEX: 27.48 KG/M2 | RESPIRATION RATE: 18 BRPM | WEIGHT: 171 LBS | HEIGHT: 66 IN | OXYGEN SATURATION: 93 % | TEMPERATURE: 98.1 F

## 2018-05-25 LAB
ANION GAP SERPL CALCULATED.3IONS-SCNC: 9 MMOL/L (ref 5–15)
ARTICHOKE IGE QN: 106 MG/DL (ref 1–129)
BASOPHILS # BLD AUTO: 0.05 10*3/MM3 (ref 0–0.2)
BASOPHILS NFR BLD AUTO: 0.9 % (ref 0–2)
BH CV ECHO MEAS - ACS: 1.9 CM
BH CV ECHO MEAS - AO MAX PG (FULL): 3 MMHG
BH CV ECHO MEAS - AO MAX PG: 5.9 MMHG
BH CV ECHO MEAS - AO MEAN PG (FULL): 1 MMHG
BH CV ECHO MEAS - AO MEAN PG: 3 MMHG
BH CV ECHO MEAS - AO ROOT AREA (BSA CORRECTED): 1.8
BH CV ECHO MEAS - AO ROOT AREA: 9.1 CM^2
BH CV ECHO MEAS - AO ROOT DIAM: 3.4 CM
BH CV ECHO MEAS - AO V2 MAX: 121 CM/SEC
BH CV ECHO MEAS - AO V2 MEAN: 87.9 CM/SEC
BH CV ECHO MEAS - AO V2 VTI: 27.8 CM
BH CV ECHO MEAS - AVA(I,A): 2.6 CM^2
BH CV ECHO MEAS - AVA(I,D): 2.6 CM^2
BH CV ECHO MEAS - AVA(V,A): 2.4 CM^2
BH CV ECHO MEAS - AVA(V,D): 2.4 CM^2
BH CV ECHO MEAS - BSA(HAYCOCK): 1.9 M^2
BH CV ECHO MEAS - BSA: 1.8 M^2
BH CV ECHO MEAS - BZI_BMI: 28.1 KILOGRAMS/M^2
BH CV ECHO MEAS - BZI_METRIC_HEIGHT: 165.1 CM
BH CV ECHO MEAS - BZI_METRIC_WEIGHT: 76.7 KG
BH CV ECHO MEAS - EDV(CUBED): 180.4 ML
BH CV ECHO MEAS - EDV(TEICH): 156.8 ML
BH CV ECHO MEAS - EF(CUBED): 77.8 %
BH CV ECHO MEAS - EF(TEICH): 69.3 %
BH CV ECHO MEAS - ESV(CUBED): 40 ML
BH CV ECHO MEAS - ESV(TEICH): 48.1 ML
BH CV ECHO MEAS - FS: 39.5 %
BH CV ECHO MEAS - IVS/LVPW: 1.1
BH CV ECHO MEAS - IVSD: 1.2 CM
BH CV ECHO MEAS - LA DIMENSION: 3.8 CM
BH CV ECHO MEAS - LA/AO: 1.1
BH CV ECHO MEAS - LV MASS(C)D: 263.9 GRAMS
BH CV ECHO MEAS - LV MASS(C)DI: 143.3 GRAMS/M^2
BH CV ECHO MEAS - LV MAX PG: 2.9 MMHG
BH CV ECHO MEAS - LV MEAN PG: 2 MMHG
BH CV ECHO MEAS - LV V1 MAX: 85 CM/SEC
BH CV ECHO MEAS - LV V1 MEAN: 57.3 CM/SEC
BH CV ECHO MEAS - LV V1 VTI: 20.6 CM
BH CV ECHO MEAS - LVIDD: 5.7 CM
BH CV ECHO MEAS - LVIDS: 3.4 CM
BH CV ECHO MEAS - LVOT AREA (M): 3.5 CM^2
BH CV ECHO MEAS - LVOT AREA: 3.5 CM^2
BH CV ECHO MEAS - LVOT DIAM: 2.1 CM
BH CV ECHO MEAS - LVPWD: 1.1 CM
BH CV ECHO MEAS - MR MAX PG: 64 MMHG
BH CV ECHO MEAS - MR MAX VEL: 400 CM/SEC
BH CV ECHO MEAS - MV A MAX VEL: 41.7 CM/SEC
BH CV ECHO MEAS - MV DEC SLOPE: 348 CM/SEC^2
BH CV ECHO MEAS - MV E MAX VEL: 57.7 CM/SEC
BH CV ECHO MEAS - MV E/A: 1.4
BH CV ECHO MEAS - MV MAX PG: 3 MMHG
BH CV ECHO MEAS - MV MEAN PG: 1 MMHG
BH CV ECHO MEAS - MV P1/2T MAX VEL: 84.4 CM/SEC
BH CV ECHO MEAS - MV P1/2T: 71 MSEC
BH CV ECHO MEAS - MV V2 MAX: 87.3 CM/SEC
BH CV ECHO MEAS - MV V2 MEAN: 39.7 CM/SEC
BH CV ECHO MEAS - MV V2 VTI: 28.9 CM
BH CV ECHO MEAS - MVA P1/2T LCG: 2.6 CM^2
BH CV ECHO MEAS - MVA(P1/2T): 3.1 CM^2
BH CV ECHO MEAS - MVA(VTI): 2.5 CM^2
BH CV ECHO MEAS - PA MAX PG: 2.2 MMHG
BH CV ECHO MEAS - PA V2 MAX: 74.7 CM/SEC
BH CV ECHO MEAS - RAP SYSTOLE: 10 MMHG
BH CV ECHO MEAS - RVDD: 2.8 CM
BH CV ECHO MEAS - RVSP: 35.6 MMHG
BH CV ECHO MEAS - SI(AO): 137.1 ML/M^2
BH CV ECHO MEAS - SI(CUBED): 76.2 ML/M^2
BH CV ECHO MEAS - SI(LVOT): 38.7 ML/M^2
BH CV ECHO MEAS - SI(TEICH): 59 ML/M^2
BH CV ECHO MEAS - SV(AO): 252.4 ML
BH CV ECHO MEAS - SV(CUBED): 140.4 ML
BH CV ECHO MEAS - SV(LVOT): 71.4 ML
BH CV ECHO MEAS - SV(TEICH): 108.7 ML
BH CV ECHO MEAS - TR MAX VEL: 253 CM/SEC
BUN BLD-MCNC: 16 MG/DL (ref 7–21)
BUN/CREAT SERPL: 21.6 (ref 7–25)
CALCIUM SPEC-SCNC: 8.9 MG/DL (ref 8.4–10.2)
CHLORIDE SERPL-SCNC: 105 MMOL/L (ref 95–110)
CHOLEST SERPL-MCNC: 179 MG/DL (ref 0–199)
CO2 SERPL-SCNC: 26 MMOL/L (ref 22–31)
CREAT BLD-MCNC: 0.74 MG/DL (ref 0.5–1)
DEPRECATED RDW RBC AUTO: 44.1 FL (ref 36.4–46.3)
EOSINOPHIL # BLD AUTO: 0.23 10*3/MM3 (ref 0–0.7)
EOSINOPHIL NFR BLD AUTO: 4.3 % (ref 0–7)
ERYTHROCYTE [DISTWIDTH] IN BLOOD BY AUTOMATED COUNT: 13.1 % (ref 11.5–14.5)
GFR SERPL CREATININE-BSD FRML MDRD: 77 ML/MIN/1.73 (ref 39–90)
GLUCOSE BLD-MCNC: 97 MG/DL (ref 60–100)
HCT VFR BLD AUTO: 35.5 % (ref 35–45)
HDLC SERPL-MCNC: 42 MG/DL (ref 60–200)
HGB BLD-MCNC: 11.7 G/DL (ref 12–15.5)
IMM GRANULOCYTES # BLD: 0.02 10*3/MM3 (ref 0–0.02)
IMM GRANULOCYTES NFR BLD: 0.4 % (ref 0–0.5)
LDLC/HDLC SERPL: 2.94 {RATIO} (ref 0–3.22)
LYMPHOCYTES # BLD AUTO: 1.53 10*3/MM3 (ref 0.6–4.2)
LYMPHOCYTES NFR BLD AUTO: 28.3 % (ref 10–50)
MAXIMAL PREDICTED HEART RATE: 149 BPM
MCH RBC QN AUTO: 30.4 PG (ref 26.5–34)
MCHC RBC AUTO-ENTMCNC: 33 G/DL (ref 31.4–36)
MCV RBC AUTO: 92.2 FL (ref 80–98)
MONOCYTES # BLD AUTO: 0.42 10*3/MM3 (ref 0–0.9)
MONOCYTES NFR BLD AUTO: 7.8 % (ref 0–12)
NEUTROPHILS # BLD AUTO: 3.15 10*3/MM3 (ref 2–8.6)
NEUTROPHILS NFR BLD AUTO: 58.3 % (ref 37–80)
NRBC BLD MANUAL-RTO: 0 /100 WBC (ref 0–0)
PLATELET # BLD AUTO: 193 10*3/MM3 (ref 150–450)
PMV BLD AUTO: 11.1 FL (ref 8–12)
POTASSIUM BLD-SCNC: 4 MMOL/L (ref 3.5–5.1)
RBC # BLD AUTO: 3.85 10*6/MM3 (ref 3.77–5.16)
SODIUM BLD-SCNC: 140 MMOL/L (ref 137–145)
STRESS TARGET HR: 127 BPM
TRIGL SERPL-MCNC: 67 MG/DL (ref 20–199)
TROPONIN I SERPL-MCNC: <0.012 NG/ML
TSH SERPL DL<=0.05 MIU/L-ACNC: 1.92 MIU/ML (ref 0.46–4.68)
WBC NRBC COR # BLD: 5.4 10*3/MM3 (ref 3.2–9.8)

## 2018-05-25 PROCEDURE — 99214 OFFICE O/P EST MOD 30 MIN: CPT | Performed by: INTERNAL MEDICINE

## 2018-05-25 PROCEDURE — 0 IOPAMIDOL PER 1 ML: Performed by: FAMILY MEDICINE

## 2018-05-25 PROCEDURE — 85025 COMPLETE CBC W/AUTO DIFF WBC: CPT | Performed by: FAMILY MEDICINE

## 2018-05-25 PROCEDURE — G0378 HOSPITAL OBSERVATION PER HR: HCPCS

## 2018-05-25 PROCEDURE — 80048 BASIC METABOLIC PNL TOTAL CA: CPT | Performed by: FAMILY MEDICINE

## 2018-05-25 PROCEDURE — 93010 ELECTROCARDIOGRAM REPORT: CPT | Performed by: INTERNAL MEDICINE

## 2018-05-25 PROCEDURE — 84484 ASSAY OF TROPONIN QUANT: CPT | Performed by: PHYSICIAN ASSISTANT

## 2018-05-25 PROCEDURE — 80061 LIPID PANEL: CPT | Performed by: FAMILY MEDICINE

## 2018-05-25 PROCEDURE — 75574 CT ANGIO HRT W/3D IMAGE: CPT

## 2018-05-25 PROCEDURE — 93005 ELECTROCARDIOGRAM TRACING: CPT | Performed by: PHYSICIAN ASSISTANT

## 2018-05-25 PROCEDURE — 84443 ASSAY THYROID STIM HORMONE: CPT | Performed by: FAMILY MEDICINE

## 2018-05-25 RX ADMIN — CITALOPRAM HYDROBROMIDE 10 MG: 10 TABLET ORAL at 10:36

## 2018-05-25 RX ADMIN — IOPAMIDOL 85 ML: 755 INJECTION, SOLUTION INTRAVENOUS at 10:15

## 2018-05-25 RX ADMIN — ASPIRIN 81 MG: 81 TABLET, COATED ORAL at 10:36

## 2018-05-25 RX ADMIN — LOSARTAN POTASSIUM 50 MG: 50 TABLET, FILM COATED ORAL at 10:36

## 2018-05-25 NOTE — TREATMENT PLAN
[facility] Medicine    Coronary CTA reviewed.  No significant abnormalities.    Will sign off.  Thank you so much for involving cardiology in the care of your patient.  Would recommend she have close follow-up with her primary care provider.          This document has been electronically signed by Derrick Rose MD PhD on May 25, 2018 11:25 AM

## 2018-05-25 NOTE — DISCHARGE SUMMARY
[facility] Medicine Services  DISCHARGE SUMMARY       Date of Admission: 5/24/2018  Date of Discharge:  5/25/2018  Primary Care Physician: Lucio Villegas MD    Presenting Problem/History of Present Illness:  Chest pain, rule out acute myocardial infarction [R07.9]     Final Discharge Diagnoses:  Hospital Problem List     Chest pain, rule out acute myocardial infarction          Consults:   Consults     Date and Time Order Name Status Description    5/24/2018 1431 Inpatient Cardiology Consult Completed     5/24/2018 1232 Hospitalist (on-call MD unless specified)          Pertinent Test Results:     Lab Results (last 24 hours)     Procedure Component Value Units Date/Time    TSH [[id number]]  (Normal) Collected:  05/25/18 0514    Specimen:  Blood Updated:  05/25/18 0720     TSH 1.920 mIU/mL     Troponin [[id number]]  (Normal) Collected:  05/25/18 0514    Specimen:  Blood Updated:  05/25/18 0640     Troponin I <0.012 ng/mL     Lipid Panel [[id number]]  (Abnormal) Collected:  05/25/18 0514    Specimen:  Blood Updated:  05/25/18 0639     Total Cholesterol 179 mg/dL      Triglycerides 67 mg/dL      HDL Cholesterol 42 (L) mg/dL      LDL Cholesterol  106 mg/dL      LDL/HDL Ratio 2.94    CBC Auto Differential [[id number]]  (Abnormal) Collected:  05/25/18 0514    Specimen:  Blood Updated:  05/25/18 0637     WBC 5.40 10*3/mm3      RBC 3.85 10*6/mm3      Hemoglobin 11.7 (L) g/dL      Hematocrit 35.5 %      MCV 92.2 fL      MCH 30.4 pg      MCHC 33.0 g/dL      RDW 13.1 %      RDW-SD 44.1 fl      MPV 11.1 fL      Platelets 193 10*3/mm3      Neutrophil % 58.3 %      Lymphocyte % 28.3 %      Monocyte % 7.8 %      Eosinophil % 4.3 %      Basophil % 0.9 %      Immature Grans % 0.4 %      Neutrophils, Absolute 3.15 10*3/mm3      Lymphocytes, Absolute 1.53 10*3/mm3      Monocytes, Absolute 0.42 10*3/mm3      Eosinophils, Absolute 0.23 10*3/mm3      Basophils, Absolute 0.05 10*3/mm3      Immature  Grans, Absolute 0.02 10*3/mm3      nRBC 0.0 /100 WBC     Basic Metabolic Panel [[id number]]  (Normal) Collected:  05/25/18 0514    Specimen:  Blood Updated:  05/25/18 0628     Glucose 97 mg/dL      BUN 16 mg/dL      Creatinine 0.74 mg/dL      Sodium 140 mmol/L      Potassium 4.0 mmol/L      Chloride 105 mmol/L      CO2 26.0 mmol/L      Calcium 8.9 mg/dL      eGFR Non African Amer 77 mL/min/1.73      BUN/Creatinine Ratio 21.6     Anion Gap 9.0 mmol/L     Narrative:       The MDRD GFR formula is only valid for adults with stable renal function between ages 18 and 70.    Hemoglobin A1c [[id number]]  (Abnormal) Collected:  05/24/18 1119    Specimen:  Blood Updated:  05/24/18 2034     Hemoglobin A1C 5.8 (H) %           Imaging Results (last 72 hours)     Procedure Component Value Units Date/Time    CT Angiogram Coronary [[id number]] Collected:  05/25/18 0915     Updated:  05/25/18 1216    Narrative:       PROCEDURE: CT HEART CORONARY ANGIOGRAM WITH IV CONTRAST    CLINICAL HISTORY: CAD risk, high, asymptomatic  , R07.9 Chest pain, unspecified: Dr Rose attached  cardiologist    COMPARISON: None.    TECHNIQUE:  Serial axial CT images were obtained through the heart at 3 mm  thickness without contrast for calcium scoring.   Subsequently, following the intravenous administration of 85 ml  of Isovue-370, serial axial CT images were obtained through the  heart at 0.6 mm thickness utilizing retrospective  gating.   Post processing was performed by the radiologist at the [facility]  workstation.   3D images including vessel probing technique were also obtained.   Full field of view reconstructed images were used for evaluation  of the extracardiac tissues.  This exam was performed using radiation doses that are as low as  reasonably achievable (ALARA).  This exam was performed according to our departmental dose  optimization program, which includes automated exposure control,  adjustment of the mA and/or KV according to patient  size and/or  use of iterative reconstruction technique.  Dose length product: 650.3    FINDINGS:  CALCIUM PLAQUE BURDEN:    REGION                                         CALCIUM SCORE  ([place])  Left Main                                                      0   Right Coronary Artery                                 0   Left Anterior Descending                           0   Circumflex                                                   0     Total calcium score is 0    Implication: No identifiable plaque  Risk of coronary artery disease: Very low, generally less than 5%    CTA OF THE CORONARY ARTERIES: There is a type III LAD. The  patient is right dominant.    Left Main: No calcified or soft itssue density plaque and no  stenosis.  LAD: There are two short segments of myocardial bridging. No  calcified or soft tissue density plaque and no stenosis.  Circumflex: No calcified or soft tissue density plaque and no  stenosis.  RCA: No calcified or soft tisue density plaque and no stenosis.    The aortic valve is tricuspid. On short axis views, the  myocardium is homogeneous in thickness.    EXTRACARDIAC SOFT TISSUES:  There are two liver cysts.  There is a small hiatal hernia.  There is evidence of old granulomatous disease in the right  hilum, right lung, and spleen.  There is discoid atelectasis in the left lung base.    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  Otherwise unremarkable exam.    Electronically signed by:  Fazal Milton MD  5/25/2018 12:15 PM  CDT Workstation: [id number]    XR Chest 2 View [[id number]] Collected:  05/24/18 1118     Updated:  05/24/18 1156    Narrative:         EXAM:          Radiograph(s), Chest   VIEWS:    PA / Lat ; 2       DATE/TIME:  5/24/2018 11:53 AM CDT               "  INDICATION:   chest pain    COMPARISON:  CXR: 5/25/17             FINDINGS:             - lines/tubes:    none     - cardiac:         size within normal limits         - mediastinum: contour within normal limits         - lungs:         no focal air space process, pulmonary  interstitial edema, nodule(s)/mass             - pleura:         no evidence of  fluid                  - osseous:         unremarkable for age                  - misc.:         Impression:       CONCLUSION:        1. No evidence of an active cardiopulmonary process.                                                Electronically signed by:  GALILEA Cummins MD  5/24/2018 11:55  AM CDT Workstation: [id number]        Hospital Course:  The patient is a 72 y.o. female who [facility] with Chest pain.  EKG normal sinus rhythm, no abnormalities.  Cardiac enzymes within normal limits.  Patient underwent coronary CTA which revealed a calcium score of 0 and an intact ejection fraction.  Patient was cleared for discharge by Dr. Rose of cardiology.  Patient chest pain resolved.  Patient stated that her chest pain was likely secondary to her using her tiller at home and also possibly secondary to scar tissue from her previous breast surgeries.  Patient was set up to follow up with her primary care provider within one week.  At the time of discharge denied chest pain, shortness of air, nausea, vomiting, dizziness, syncope, presyncope.  States she felt well and ready to go home.  Was instructed to return with any worsening or concerning symptoms.      Condition on Discharge:  Stable    Physical Exam on Discharge:  /66 (BP Location: Left arm, Patient Position: Lying)   Pulse 84   Temp 98.1 °F (36.7 °C) (Oral)   Resp 18   Ht 167.6 cm (65.98\")   Wt 77.6 kg (171 lb)   LMP  (LMP Unknown)   SpO2 93%   BMI 27.61 kg/m²   Physical Exam   Constitutional: She is oriented to person, place, and time. She appears " well-developed and well-nourished. No distress.   HENT:   Head: Normocephalic and atraumatic.   Cardiovascular: Normal rate and regular rhythm.    Pulmonary/Chest: Effort normal and breath sounds normal. No respiratory distress. She has no wheezes.   Abdominal: Soft. Bowel sounds are normal. She exhibits no distension. There is no tenderness.   Musculoskeletal: She exhibits no edema.   Neurological: She is alert and oriented to person, place, and time.   Skin: Skin is warm and dry. Capillary refill takes less than 2 seconds. She is not diaphoretic.   Psychiatric: She has a normal mood and affect. Her behavior is normal.     Discharge Disposition:  Home or Self Care    Discharge Medications:   Em Munoz   Home Medication Instructions MARJORIE:[id number]    Printed on:05/25/18 5226   Medication Information                      citalopram (CeleXA) 20 MG tablet  Take 20 mg by mouth Daily. 1/2 tab daily             levothyroxine sodium (TIROSINT) 75 MCG capsule  Take 75 mcg by mouth Daily.             losartan (COZAAR) 50 MG tablet  Take 50 mg by mouth Daily.             Omega-3 Fatty Acids (OMEGA-3 EPA FISH OIL PO)  Take 600 mg by mouth Daily.             omeprazole (priLOSEC) 20 MG capsule  Take 20 mg by mouth Daily.             temazepam (RESTORIL) 30 MG capsule  Take 30 mg by mouth At Night As Needed for sleep.                 Discharge Diet:   Diet Instructions     Diet: Regular       Discharge Diet:  Regular          Activity at Discharge:   Activity Instructions     Activity as Tolerated             Discharge Care Plan/Instructions:  Follow up with PCP.  Return with any worsening or concerning symptoms.          This document has been electronically signed by ANTONIO Ortiz on May 25, 2018 2:55 PM

## 2018-05-25 NOTE — PROGRESS NOTES
"  Cardiovascular Daily Inpatient Progress Note  Derrick Rose M.D., Ph.D., [facility]      Subjective     Interval History:   No further chest pain episodes.    Review of Systems   Cardiovascular: Positive for chest pain. Negative for claudication, irregular heartbeat, leg swelling, near-syncope, orthopnea, palpitations, paroxysmal nocturnal dyspnea and syncope.   Respiratory: Negative.        Objective     Vital Sign Min/Max for last 24 hours  Temp  Min: 96.4 °F (35.8 °C)  Max: 98.6 °F (37 °C)   BP  Min: 104/56  Max: 157/79   Pulse  Min: 54  Max: 78   Resp  Min: 18  Max: 19   SpO2  Min: 92 %  Max: 99 %   No Data Recorded   Weight  Min: 77 kg (169 lb 12.8 oz)  Max: 78.1 kg (172 lb 3.2 oz)     Flowsheet Rows      First Filed Value   Admission Height  167.6 cm (66\") Documented at 05/24/2018 1040   Admission Weight  78.1 kg (172 lb 3.2 oz) Documented at 05/24/2018 1040        1    05/24/18  1040 05/24/18  1417 05/25/18  0600   Weight: 78.1 kg (172 lb 3.2 oz) 77 kg (169 lb 12.8 oz) 77.6 kg (171 lb)     Body mass index is 27.61 kg/m².    Physical Exam:  Vitals:    05/25/18 0018   BP:    Pulse: 78   Resp:    Temp:    SpO2:      Body mass index is 27.61 kg/m².   Pulse Ox: Normal   General: alert, appears stated age and cooperative  Body Habitus: overweight  HEENT: Head: Normocephalic, no lesions, without obvious abnormality. No arcus senilis, xanthelasma or xanthomas.  JVP: 7 cm of water at 45 degrees   Volume/Pulsation: Normal  Pulmonary:Normal     Heart rate: normal    Heart Rhythm: regular     Heart Sounds: S1: normal intensity  S2: normal  S3: absent   S4: absent  Opening Snap: absent  A2-OS:  absent.   Pericardial rub: absent    Ejection click: None      Murmurs:  absent   Extremity: moves all extremities equally.        DATA REVIEWED:     EKG. I personally reviewed and interpreted the EKG.        Normal sinus rhythm.  Normal intervals.  Normal EKG.  EKG was compared to EKG dated May 24, 2018 at 10:37 AM.  No " significant change.  TTE/BRANDY:       My interpretation of the TTE is below.    I personally reviewed the 2-D TTE imaging.  For currently having a reporting software issue, so the report is actually not currently in EPIC.  However, preserved LVEF without RWMAs.  Right ventricle EF preserved.  No significant valvular abnormalities.  No pericardial effusion.  Visualized portions of the aorta are normal.    CXR/Imaging:     Imaging Results (most recent)     Procedure Component Value Units Date/Time    XR Chest 2 View [[id number]] Collected:  05/24/18 1118     Updated:  05/24/18 1156    Narrative:         EXAM:          Radiograph(s), Chest   VIEWS:    PA / Lat ; 2       DATE/TIME:  5/24/2018 11:53 AM CDT                INDICATION:   chest pain    COMPARISON:  CXR: 5/25/17             FINDINGS:             - lines/tubes:    none     - cardiac:         size within normal limits         - mediastinum: contour within normal limits         - lungs:         no focal air space process, pulmonary  interstitial edema, nodule(s)/mass             - pleura:         no evidence of  fluid                  - osseous:         unremarkable for age                  - misc.:         Impression:       CONCLUSION:        1. No evidence of an active cardiopulmonary process.                                                Electronically signed by:  GALILEA Cummins MD  5/24/2018 11:55  AM CDT Workstation: [id number]      I personally reviewed the chest x-ray.  No focal air space disease, pulmonary interstitial edema, nodules or masses.  No pleural effusion.  Cardiac silhouette is normal.  Mediastinum has a normal contour within normal limits.    LABS:   Lab Results   Component Value Date    GLUCOSE 97 05/25/2018    BUN 16 05/25/2018    CREATININE 0.74 05/25/2018    EGFRIFNONA 77 05/25/2018    BCR 21.6 05/25/2018    K 4.0 05/25/2018    CO2 26.0 05/25/2018    CALCIUM 8.9 05/25/2018    ALBUMIN 3.80 05/24/2018    LABIL2 1.4 05/24/2018    AST 31  05/24/2018    ALT 32 05/24/2018       Lab Results   Component Value Date    WBC 5.40 05/25/2018    HGB 11.7 (L) 05/25/2018    HCT 35.5 05/25/2018    MCV 92.2 05/25/2018     05/25/2018       Lab Results   Component Value Date    CHOL 179 05/25/2018    TRIG 67 05/25/2018    HDL 42 (L) 05/25/2018     05/25/2018       Lab Results   Component Value Date    TSH 1.920 05/25/2018       Lab Results   Component Value Date    CKTOTAL 72 05/25/2017    CKMB 0.76 05/25/2017    TROPONINI <0.012 05/25/2018       Lab Results   Component Value Date    HGBA1C 5.8 (H) 05/24/2018     No results found for: DDIMER  Lab Results   Component Value Date    ALT 32 05/24/2018     Lab Results   Component Value Date    HGBA1C 5.8 (H) 05/24/2018     Lab Results   Component Value Date    CREATININE 0.74 05/25/2018     No results found for: IRON, TIBC, FERRITIN  Lab Results   Component Value Date    INR 1.01 05/25/2017    PROTIME 13.2 05/25/2017     Assessment/Plan     1. Chest pain syndrome. Em Munoz has a chest pain syndrome with pain complaints that are best characterized as atypical.  The patient's had no further chest pain episodes.  The patient had no objective evidence of ischemia.  Her EKG is entirely normal.  She now has 3 sets of troponins that are unremarkable.  With that said, the patient is Moderate Risk.  An ischemia evaluation is indicated.   -Risks/Benefits discussed.   -Agree with CCTA with Agaston scoring  -Additional recommendations pending results of her coronary CTA          This document has been electronically signed by Derrick Rose MD PhD on May 25, 2018 7:27 AM

## 2021-03-03 ENCOUNTER — APPOINTMENT (OUTPATIENT)
Dept: GENERAL RADIOLOGY | Facility: HOSPITAL | Age: 76
End: 2021-03-03

## 2021-03-03 ENCOUNTER — HOSPITAL ENCOUNTER (EMERGENCY)
Facility: HOSPITAL | Age: 76
Discharge: HOME | End: 2021-03-04
Attending: EMERGENCY MEDICINE | Admitting: EMERGENCY MEDICINE

## 2021-03-03 DIAGNOSIS — I10 HYPERTENSION, UNSPECIFIED TYPE: Primary | ICD-10-CM

## 2021-03-03 LAB
ALBUMIN SERPL-MCNC: 4.3 G/DL (ref 3.5–5.2)
ALBUMIN/GLOB SERPL: 1.7 G/DL
ALP SERPL-CCNC: 83 U/L (ref 39–117)
ALT SERPL W P-5'-P-CCNC: 18 U/L (ref 1–33)
ANION GAP SERPL CALCULATED.3IONS-SCNC: 7 MMOL/L (ref 5–15)
AST SERPL-CCNC: 24 U/L (ref 1–32)
BASOPHILS # BLD AUTO: 0.07 10*3/MM3 (ref 0–0.2)
BASOPHILS NFR BLD AUTO: 1 % (ref 0–1.5)
BILIRUB SERPL-MCNC: 0.3 MG/DL (ref 0–1.2)
BUN SERPL-MCNC: 15 MG/DL (ref 8–23)
BUN/CREAT SERPL: 16.5 (ref 7–25)
CALCIUM SPEC-SCNC: 9.5 MG/DL (ref 8.6–10.5)
CHLORIDE SERPL-SCNC: 103 MMOL/L (ref 98–107)
CO2 SERPL-SCNC: 32 MMOL/L (ref 22–29)
CREAT SERPL-MCNC: 0.91 MG/DL (ref 0.57–1)
DEPRECATED RDW RBC AUTO: 44.3 FL (ref 37–54)
EOSINOPHIL # BLD AUTO: 0.21 10*3/MM3 (ref 0–0.4)
EOSINOPHIL NFR BLD AUTO: 3.1 % (ref 0.3–6.2)
ERYTHROCYTE [DISTWIDTH] IN BLOOD BY AUTOMATED COUNT: 13 % (ref 12.3–15.4)
GFR SERPL CREATININE-BSD FRML MDRD: 60 ML/MIN/1.73
GLOBULIN UR ELPH-MCNC: 2.5 GM/DL
GLUCOSE SERPL-MCNC: 101 MG/DL (ref 65–99)
HCT VFR BLD AUTO: 37.4 % (ref 34–46.6)
HGB BLD-MCNC: 12.4 G/DL (ref 12–15.9)
HOLD SPECIMEN: NORMAL
HOLD SPECIMEN: NORMAL
IMM GRANULOCYTES # BLD AUTO: 0.01 10*3/MM3 (ref 0–0.05)
IMM GRANULOCYTES NFR BLD AUTO: 0.1 % (ref 0–0.5)
LYMPHOCYTES # BLD AUTO: 2.15 10*3/MM3 (ref 0.7–3.1)
LYMPHOCYTES NFR BLD AUTO: 31.4 % (ref 19.6–45.3)
MCH RBC QN AUTO: 30.8 PG (ref 26.6–33)
MCHC RBC AUTO-ENTMCNC: 33.2 G/DL (ref 31.5–35.7)
MCV RBC AUTO: 93 FL (ref 79–97)
MONOCYTES # BLD AUTO: 0.55 10*3/MM3 (ref 0.1–0.9)
MONOCYTES NFR BLD AUTO: 8 % (ref 5–12)
NEUTROPHILS NFR BLD AUTO: 3.85 10*3/MM3 (ref 1.7–7)
NEUTROPHILS NFR BLD AUTO: 56.4 % (ref 42.7–76)
NRBC BLD AUTO-RTO: 0 /100 WBC (ref 0–0.2)
NT-PROBNP SERPL-MCNC: 135.9 PG/ML (ref 0–900)
PLATELET # BLD AUTO: 220 10*3/MM3 (ref 140–450)
PMV BLD AUTO: 10.3 FL (ref 6–12)
POTASSIUM SERPL-SCNC: 3.9 MMOL/L (ref 3.5–5.2)
PROT SERPL-MCNC: 6.8 G/DL (ref 6–8.5)
RBC # BLD AUTO: 4.02 10*6/MM3 (ref 3.77–5.28)
SODIUM SERPL-SCNC: 142 MMOL/L (ref 136–145)
TROPONIN T SERPL-MCNC: <0.01 NG/ML (ref 0–0.03)
WBC # BLD AUTO: 6.84 10*3/MM3 (ref 3.4–10.8)
WHOLE BLOOD HOLD SPECIMEN: NORMAL
WHOLE BLOOD HOLD SPECIMEN: NORMAL

## 2021-03-03 PROCEDURE — 25010000002 ONDANSETRON PER 1 MG: Performed by: EMERGENCY MEDICINE

## 2021-03-03 PROCEDURE — 83880 ASSAY OF NATRIURETIC PEPTIDE: CPT | Performed by: EMERGENCY MEDICINE

## 2021-03-03 PROCEDURE — 71045 X-RAY EXAM CHEST 1 VIEW: CPT

## 2021-03-03 PROCEDURE — 80053 COMPREHEN METABOLIC PANEL: CPT | Performed by: EMERGENCY MEDICINE

## 2021-03-03 PROCEDURE — 84484 ASSAY OF TROPONIN QUANT: CPT | Performed by: EMERGENCY MEDICINE

## 2021-03-03 PROCEDURE — 93010 ELECTROCARDIOGRAM REPORT: CPT | Performed by: INTERNAL MEDICINE

## 2021-03-03 PROCEDURE — 25010000002 HYDRALAZINE PER 20 MG: Performed by: EMERGENCY MEDICINE

## 2021-03-03 PROCEDURE — 96375 TX/PRO/DX INJ NEW DRUG ADDON: CPT

## 2021-03-03 PROCEDURE — 99284 EMERGENCY DEPT VISIT MOD MDM: CPT

## 2021-03-03 PROCEDURE — 93005 ELECTROCARDIOGRAM TRACING: CPT | Performed by: EMERGENCY MEDICINE

## 2021-03-03 PROCEDURE — 96374 THER/PROPH/DIAG INJ IV PUSH: CPT

## 2021-03-03 PROCEDURE — 85025 COMPLETE CBC W/AUTO DIFF WBC: CPT | Performed by: EMERGENCY MEDICINE

## 2021-03-03 RX ORDER — SODIUM CHLORIDE 0.9 % (FLUSH) 0.9 %
10 SYRINGE (ML) INJECTION AS NEEDED
Status: DISCONTINUED | OUTPATIENT
Start: 2021-03-03 | End: 2021-03-04 | Stop reason: HOSPADM

## 2021-03-03 RX ORDER — TRAZODONE HYDROCHLORIDE 50 MG/1
50 TABLET ORAL NIGHTLY
COMMUNITY

## 2021-03-03 RX ORDER — SODIUM CHLORIDE 9 MG/ML
INJECTION, SOLUTION INTRAVENOUS
Status: DISCONTINUED
Start: 2021-03-03 | End: 2021-03-04 | Stop reason: HOSPADM

## 2021-03-03 RX ORDER — ONDANSETRON 2 MG/ML
INJECTION INTRAMUSCULAR; INTRAVENOUS
Status: DISCONTINUED
Start: 2021-03-03 | End: 2021-03-04 | Stop reason: HOSPADM

## 2021-03-03 RX ORDER — CLONIDINE HYDROCHLORIDE 0.1 MG/1
0.1 TABLET ORAL 2 TIMES DAILY PRN
Qty: 30 TABLET | Refills: 0 | Status: SHIPPED | OUTPATIENT
Start: 2021-03-03

## 2021-03-03 RX ORDER — HYDRALAZINE HYDROCHLORIDE 20 MG/ML
20 INJECTION INTRAMUSCULAR; INTRAVENOUS ONCE
Status: COMPLETED | OUTPATIENT
Start: 2021-03-03 | End: 2021-03-03

## 2021-03-03 RX ORDER — ONDANSETRON 2 MG/ML
4 INJECTION INTRAMUSCULAR; INTRAVENOUS ONCE
Status: COMPLETED | OUTPATIENT
Start: 2021-03-03 | End: 2021-03-03

## 2021-03-03 RX ADMIN — HYDRALAZINE HYDROCHLORIDE 20 MG: 20 INJECTION INTRAMUSCULAR; INTRAVENOUS at 21:50

## 2021-03-03 RX ADMIN — ONDANSETRON 4 MG: 2 INJECTION INTRAMUSCULAR; INTRAVENOUS at 23:16

## 2021-03-03 RX ADMIN — SODIUM CHLORIDE 500 ML: 9 INJECTION, SOLUTION INTRAVENOUS at 23:17

## 2021-03-04 VITALS
OXYGEN SATURATION: 98 % | DIASTOLIC BLOOD PRESSURE: 64 MMHG | RESPIRATION RATE: 18 BRPM | TEMPERATURE: 98.2 F | HEART RATE: 84 BPM | HEIGHT: 65 IN | BODY MASS INDEX: 29.46 KG/M2 | SYSTOLIC BLOOD PRESSURE: 133 MMHG | WEIGHT: 176.8 LBS

## 2021-03-04 NOTE — ED NOTES
Dr. kaye gave verbal order to reinsert IV, 500 cc bolus NS and zofran 4 mg IV, RBV     Carlita Cottrell, RN  03/03/21 9737

## 2021-03-04 NOTE — ED NOTES
Patient started feeling nauseated and dizzy just before discharge     Shyann Edmondson, RN  03/03/21 2825

## 2021-03-04 NOTE — ED PROVIDER NOTES
Subjective   Patient presents for department complaint of hypertension.  Patient notes getting the Covid shot a month ago and then a repeat today.  With both times her blood pressure is skyrocketed over 200.  Patient presents asymptomatically but with elevated blood pressures which are concerning to her.  Patient is a blood pressure has been running somewhat higher since the first shot though he she did not associated with the shot until she she got the other shot today when her blood pressure went up appreciably as well.  Patient having no chest pain or shortness of breath.  No bowel or bladder changes.  No urinary output changes.  No recent fevers or chills or other symptoms.  No focal neurologic deficits.          Review of Systems   Constitutional: Negative.  Negative for appetite change, chills and fever.   HENT: Negative.  Negative for congestion.    Eyes: Negative.  Negative for photophobia and visual disturbance.   Respiratory: Negative.  Negative for cough, chest tightness and shortness of breath.    Cardiovascular: Negative.  Negative for chest pain and palpitations.   Gastrointestinal: Negative.  Negative for abdominal pain, constipation, diarrhea, nausea and vomiting.   Endocrine: Negative.    Genitourinary: Negative.  Negative for decreased urine volume, dysuria, flank pain and hematuria.   Musculoskeletal: Negative.  Negative for arthralgias, back pain, myalgias, neck pain and neck stiffness.   Skin: Negative.  Negative for pallor.   Neurological: Negative.  Negative for dizziness, syncope, weakness, light-headedness, numbness and headaches.   Psychiatric/Behavioral: Negative.  Negative for confusion and suicidal ideas. The patient is not nervous/anxious.    All other systems reviewed and are negative.      Past Medical History:   Diagnosis Date   • Cancer (CMS/HCC)     breast   • Disease of thyroid gland    • Esophageal stricture    • GERD (gastroesophageal reflux disease)    • Hiatal hernia    •  Hyperlipidemia    • Hypertension    • Pneumonia        Allergies   Allergen Reactions   • Atorvastatin Other (See Comments)     Muscle pain   • Penicillins    • Penicillins Rash       Past Surgical History:   Procedure Laterality Date   • APPENDECTOMY     • BREAST SURGERY     • CATARACT EXTRACTION, BILATERAL     • CHOLECYSTECTOMY     • COLONOSCOPY N/A 3/30/2018    Procedure: COLONOSCOPY;  Surgeon: Cristopher Godoy MD;  Location: [facility] ENDOSCOPY;  Service: Gastroenterology   • MASTECTOMY     • MASTECTOMY Right        Family History   Problem Relation Age of Onset   • Heart attack Father    • Diabetes Father    • Hypertension Sister    • Diabetes Sister    • Heart attack Brother    • Hypertension Brother    • Stroke Brother    • Hypertension Sister    • COPD Mother    • Diabetes Sister    • Stroke Brother    • Heart attack Brother    • Abnormal EKG Son        Social History     Socioeconomic History   • Marital status:      Spouse name: Not on file   • Number of children: Not on file   • Years of education: Not on file   • Highest education level: Not on file   Tobacco Use   • Smoking status: Never Smoker   • Smokeless tobacco: Never Used   Substance and Sexual Activity   • Alcohol use: No   • Drug use: No   • Sexual activity: Defer   Social History Narrative    ** Merged History Encounter **                Objective   Physical Exam  Vitals signs and nursing note reviewed.   Constitutional:       General: She is not in acute distress.     Appearance: Normal appearance. She is well-developed. She is not diaphoretic.   HENT:      Head: Normocephalic and atraumatic.      Nose: Nose normal.   Eyes:      General: No scleral icterus.     Conjunctiva/sclera: Conjunctivae normal.   Neck:      Musculoskeletal: Normal range of motion and neck supple.      Vascular: No JVD.   Cardiovascular:      Rate and Rhythm: Normal rate and regular rhythm.      Heart sounds: Normal heart sounds. No murmur. No friction rub. No  gallop.    Pulmonary:      Effort: Pulmonary effort is normal. No respiratory distress.      Breath sounds: No wheezing or rales.   Chest:      Chest wall: No tenderness.   Abdominal:      General: There is no distension.      Palpations: Abdomen is soft. There is no mass.      Tenderness: There is no abdominal tenderness. There is no guarding or rebound.   Musculoskeletal: Normal range of motion.         General: No tenderness or deformity.   Lymphadenopathy:      Cervical: No cervical adenopathy.   Skin:     General: Skin is warm and dry.      Capillary Refill: Capillary refill takes less than 2 seconds.      Coloration: Skin is not pale.      Findings: No erythema or rash.   Neurological:      General: No focal deficit present.      Mental Status: She is alert and oriented to person, place, and time.      Cranial Nerves: No cranial nerve deficit.      Motor: No abnormal muscle tone.   Psychiatric:         Behavior: Behavior normal.         Thought Content: Thought content normal.         Judgment: Judgment normal.         Procedures           ED Course                                 Labs Reviewed   COMPREHENSIVE METABOLIC PANEL - Abnormal; Notable for the following components:       Result Value    Glucose 101 (*)     CO2 32.0 (*)     eGFR Non  Amer 60 (*)     All other components within normal limits    Narrative:     GFR Normal >60  Chronic Kidney Disease <60  Kidney Failure <15     TROPONIN (IN-HOUSE) - Normal    Narrative:     Troponin T Reference Range:  <= 0.03 ng/mL-   Negative for AMI  >0.03 ng/mL-     Abnormal for myocardial necrosis.  Clinicians would have to utilize clinical acumen, EKG, Troponin and serial changes to determine if it is an Acute Myocardial Infarction or myocardial injury due to an underlying chronic condition.       Results may be falsely decreased if patient taking Biotin.     BNP (IN-HOUSE) - Normal    Narrative:     Among patients with dyspnea, NT-proBNP is highly sensitive  for the detection of acute congestive heart failure. In addition NT-proBNP of <300 pg/ml effectively rules out acute congestive heart failure with 99% negative predictive value.    Results may be falsely decreased if patient taking Biotin.     CBC WITH AUTO DIFFERENTIAL - Normal   RAINBOW DRAW    Narrative:     The following orders were created for panel order Harned Draw.  Procedure                               Abnormality         Status                     ---------                               -----------         ------                     Light Blue Top[[id number]]                                   In process                 Green Top (Gel)[[id number]]                                  In process                 Lavender Top[[id number]]                                     In process                 Gold Top - SST[[id number]]                                   In process                   Please view results for these tests on the individual orders.   TROPONIN (IN-HOUSE)   CBC AND DIFFERENTIAL    Narrative:     The following orders were created for panel order CBC & Differential.  Procedure                               Abnormality         Status                     ---------                               -----------         ------                     CBC Auto Differential[[id number]]        Normal              Final result                 Please view results for these tests on the individual orders.   LIGHT BLUE TOP   GREEN TOP   LAVENDER TOP   GOLD TOP - SST       XR Chest 1 View   Final Result      NO ACUTE CARDIOPULMONARY FINDINGS.      Electronically signed by:  Wen Petty  3/3/2021 9:58 PM CST   Workstation: [id number]        Patient improved with ED interventions.  Patient be discharged outpatient follow-up for with clonidine for emergency blood pressure issues.          [facility]    Final diagnoses:   Hypertension, unspecified type            Ayden Nayak MD  03/03/21 1232

## 2021-03-04 NOTE — ED NOTES
Pt ambulated around nurses' station. Tolerated without difficulty. No dizziness/weakness noted. States ready for DC. Dr. Alba aware. States ok to DC home.     Beatriz Belle, RN  03/04/21 0042       Beatriz Belle, HIEU  03/04/21 0043

## 2021-03-04 NOTE — ED NOTES
Dr. Nayak advised of patient current status. Verbal orders given.     Shyann Edmondson, RN  03/03/21 8647

## 2021-03-04 NOTE — ED NOTES
Pt c/o hypertension this evening, she states she had the covid vaccine this afternoon and her bp began getting higher this evening.  Her pcp increased her losartan from 50 to 100 lst Wednesday. Pt denies headache, chest pain or other neuro symptoms at this time.     Carlita Cottrell, RN  03/03/21 2123

## 2021-03-21 LAB
QT INTERVAL: 414 MS
QTC INTERVAL: 471 MS

## 2021-12-20 ENCOUNTER — HOSPITAL ENCOUNTER (OUTPATIENT)
Facility: HOSPITAL | Age: 76
Setting detail: HOSPITAL OUTPATIENT SURGERY
Discharge: HOME | End: 2021-12-20
Attending: INTERNAL MEDICINE | Admitting: INTERNAL MEDICINE

## 2021-12-20 ENCOUNTER — ANESTHESIA (OUTPATIENT)
Dept: GASTROENTEROLOGY | Facility: HOSPITAL | Age: 76
End: 2021-12-20

## 2021-12-20 ENCOUNTER — ANESTHESIA EVENT (OUTPATIENT)
Dept: GASTROENTEROLOGY | Facility: HOSPITAL | Age: 76
End: 2021-12-20

## 2021-12-20 VITALS
HEIGHT: 65 IN | HEART RATE: 68 BPM | DIASTOLIC BLOOD PRESSURE: 54 MMHG | TEMPERATURE: 96.7 F | OXYGEN SATURATION: 93 % | WEIGHT: 271 LBS | BODY MASS INDEX: 45.15 KG/M2 | SYSTOLIC BLOOD PRESSURE: 102 MMHG | RESPIRATION RATE: 18 BRPM

## 2021-12-20 PROCEDURE — 25010000002 PROPOFOL 10 MG/ML EMULSION: Performed by: NURSE ANESTHETIST, CERTIFIED REGISTERED

## 2021-12-20 PROCEDURE — 25010000002 FENTANYL CITRATE (PF) 50 MCG/ML SOLUTION: Performed by: NURSE ANESTHETIST, CERTIFIED REGISTERED

## 2021-12-20 RX ORDER — PROPOFOL 10 MG/ML
VIAL (ML) INTRAVENOUS AS NEEDED
Status: DISCONTINUED | OUTPATIENT
Start: 2021-12-20 | End: 2021-12-20 | Stop reason: SURG

## 2021-12-20 RX ORDER — DEXTROSE AND SODIUM CHLORIDE 5; .45 G/100ML; G/100ML
30 INJECTION, SOLUTION INTRAVENOUS CONTINUOUS PRN
Status: DISCONTINUED | OUTPATIENT
Start: 2021-12-20 | End: 2021-12-20 | Stop reason: HOSPADM

## 2021-12-20 RX ORDER — CHOLECALCIFEROL (VITAMIN D3) 125 MCG
10 CAPSULE ORAL NIGHTLY
COMMUNITY

## 2021-12-20 RX ORDER — FENTANYL CITRATE 50 UG/ML
INJECTION, SOLUTION INTRAMUSCULAR; INTRAVENOUS AS NEEDED
Status: DISCONTINUED | OUTPATIENT
Start: 2021-12-20 | End: 2021-12-20 | Stop reason: SURG

## 2021-12-20 RX ADMIN — PROPOFOL 50 MG: 10 INJECTION, EMULSION INTRAVENOUS at 13:13

## 2021-12-20 RX ADMIN — FENTANYL CITRATE 100 MCG: 50 INJECTION INTRAMUSCULAR; INTRAVENOUS at 13:00

## 2021-12-20 RX ADMIN — PROPOFOL 50 MG: 10 INJECTION, EMULSION INTRAVENOUS at 13:07

## 2021-12-20 RX ADMIN — GLYCOPYRROLATE 0.2 MG: 0.2 INJECTION, SOLUTION INTRAMUSCULAR; INTRAVITREAL at 13:20

## 2021-12-20 RX ADMIN — DEXTROSE AND SODIUM CHLORIDE 30 ML/HR: 5; 450 INJECTION, SOLUTION INTRAVENOUS at 12:40

## 2021-12-20 RX ADMIN — PROPOFOL 50 MG: 10 INJECTION, EMULSION INTRAVENOUS at 13:21

## 2021-12-20 RX ADMIN — PROPOFOL 50 MG: 10 INJECTION, EMULSION INTRAVENOUS at 13:00

## 2021-12-20 RX ADMIN — PROPOFOL 50 MG: 10 INJECTION, EMULSION INTRAVENOUS at 13:09

## 2021-12-20 NOTE — H&P
Rebeka Singh DO,[facility]  Gastroenterology  Hepatology  Endoscopy  Board Certified in Internal Medicine and gastroenterology  [address], [address]  [place]. [address]  - [phone]   F - [phone]     GASTROENTEROLOGY HISTORY AND PHYSICAL  NOTE   REBEKA SINGH DO.         SUBJECTIVE:   12/20/2021    Name: Em Munoz  DOD: 1945        Chief Complaint:       Subjective : Personal history of colon polyps    Patient is 76 y.o. female presents with desire for elective colonoscopy.      ROS/HISTORY/ CURRENT MEDICATIONS/OBJECTIVE/VS/PE:   Review of Systems:  All systems unremarkable unless specified below.  Constitutional   HENT  Eyes   Respiratory    Cardiovascular  Gastrointestinal   Endocrine  Genitourinary    Musculoskeletal   Skin  Allergic/Immunologic    Neurological    Hematological  Psychiatric/Behavioral    History:     Past Medical History:   Diagnosis Date   • Cancer (HCC)     breast   • Disease of thyroid gland    • Esophageal stricture    • GERD (gastroesophageal reflux disease)    • Hiatal hernia    • Hyperlipidemia    • Hypertension    • Pneumonia      Past Surgical History:   Procedure Laterality Date   • APPENDECTOMY     • BREAST SURGERY     • CATARACT EXTRACTION, BILATERAL     • CHOLECYSTECTOMY     • COLONOSCOPY N/A 3/30/2018    Procedure: COLONOSCOPY;  Surgeon: Cristopher Godoy MD;  Location: [facility] ENDOSCOPY;  Service: Gastroenterology   • MASTECTOMY     • MASTECTOMY Right      Family History   Problem Relation Age of Onset   • Heart attack Father    • Diabetes Father    • Hypertension Sister    • Diabetes Sister    • Heart attack Brother    • Hypertension Brother    • Stroke Brother    • Hypertension Sister    • COPD Mother    • Diabetes Sister    • Stroke Brother    • Heart attack Brother    • Abnormal EKG Son      Social History     Tobacco Use   • Smoking status: Never Smoker   • Smokeless tobacco: Never Used   Vaping Use   • Vaping Use: Never used    Substance Use Topics   • Alcohol use: No   • Drug use: Never     Prior to Admission medications    Medication Sig Start Date End Date Taking? Authorizing Provider   citalopram (CeleXA) 20 MG tablet Take 20 mg by mouth Daily. 1/2 tab daily   Yes Malinda Rodriguez MD   levothyroxine sodium (TIROSINT) 75 MCG capsule Take 75 mcg by mouth Daily.   Yes Malinda Rodriguez MD   losartan (COZAAR) 50 MG tablet Take 100 mg by mouth Daily.   Yes Malinda Rodriguez MD   melatonin 5 MG tablet tablet Take 10 mg by mouth Every Night.   Yes Malinda Rodriguez MD   metoprolol tartrate (LOPRESSOR) 25 MG tablet Take 25 mg by mouth 2 (Two) Times a Day.   Yes Malinda Rodriguez MD   Omega-3 Fatty Acids (OMEGA-3 EPA FISH OIL PO) Take 600 mg by mouth Daily.   Yes Malinda Rodriguez MD   omeprazole (priLOSEC) 20 MG capsule Take 20 mg by mouth Daily.   Yes Malinda Rodriguez MD   traZODone (DESYREL) 50 MG tablet Take 50 mg by mouth Every Night.   Yes Malinda Rodriguez MD   vitamin D3 125 MCG (5000 UT) capsule capsule Take 5,000 Units by mouth Daily.   Yes Malinda Rodriguez MD   cloNIDine (CATAPRES) 0.1 MG tablet Take 1 tablet by mouth 2 (Two) Times a Day As Needed for High Blood Pressure (HBP >180/100). 3/3/21   Ayden Nayak MD   temazepam (RESTORIL) 30 MG capsule Take 30 mg by mouth At Night As Needed for sleep.    Malinda Rodriguez MD     Allergies:  Atorvastatin, Penicillins, and Penicillins    I have reviewed the patients medical history, surgical history and family history in the available medical record system.     Current Medications:     Current Facility-Administered Medications   Medication Dose Route Frequency Provider Last Rate Last Admin   • dextrose 5 % and sodium chloride 0.45 % infusion  30 mL/hr Intravenous Continuous PRN Danny Gardner DO 30 mL/hr at 12/20/21 1240 30 mL/hr at 12/20/21 1240       Objective     Physical Exam:   Temp:  [97.5 °F (36.4 °C)] 97.5 °F (36.4 °C)  Heart  Rate:  [67] 67  Resp:  [18] 18  BP: (148)/(77) 148/77    Physical Exam:  General Appearance:    Alert, cooperative, in no acute distress   Head:    Normocephalic, without obvious abnormality, atraumatic   Eyes:            Lids and lashes normal, conjunctivae and sclerae normal, no icterus, no pallor, corneas clear, PERRLA   Ears:    Ears appear intact with no abnormalities noted   Throat:   No oral lesions, no thrush, oral mucosa moist   Neck:   No adenopathy, supple, trachea midline, no thyromegaly, no  carotid bruit, no JVD   Back:     No kyphosis present, no scoliosis present, no skin lesions,   erythema or scars, no tenderness to percussion or                 palpation,  range of motion normal   Lungs:     Clear to auscultation,respirations regular, even and         unlabored    Heart:    Regular rhythm and normal rate, normal S1 and S2, no  murmur, no gallop, no rub, no click   Breast Exam:    Deferred   Abdomen:     Normal bowel sounds, no masses, no organomegaly, soft  nontender, nondistended, no guarding, no rebound                 tenderness   Genitalia:    Deferred   Extremities:   Moves all extremities well, no edema, no cyanosis, no          redness   Pulses:   Pulses palpable and equal bilaterally   Skin:   No bleeding, bruising or rash   Lymph nodes:   No palpable adenopathy   Neurologic:   Cranial nerves 2 - 12 grossly intact, sensation intact, DTR     present and equal bilaterally      Results Review:     Lab Results   Component Value Date    WBC 6.84 03/03/2021    WBC 7 03/07/2019    WBC 5 07/05/2018    HGB 12.4 03/03/2021    HGB 13.3 03/07/2019    HGB 13 07/05/2018    HCT 37.4 03/03/2021    HCT 41.6 03/07/2019    HCT 40.4 07/05/2018     03/03/2021     03/07/2019     07/05/2018             No results found for: LIPASE  Lab Results   Component Value Date    INR 1.01 05/25/2017     No results found for: THROATCX    Radiology Review:  Imaging Results (Last 72 Hours)     ** No  results found for the last 72 hours. **           I reviewed the patient's new clinical results.  I reviewed the patient's new imaging results and agree with the interpretation.     ASSESSMENT/PLAN:   ASSESSMENT:  1.  Personal history of colon polyps    PLAN:  1.  Colonoscopy    Risk and benefits associated with the procedure are reviewed with the patient.  The patient wished to proceed     Danny Gardner DO  12/20/21  12:52 CST

## 2021-12-20 NOTE — ADDENDUM NOTE
Addendum  created 12/20/21 1340 by Dot Curry, CRNA    Flowsheet accepted, Intraprocedure Event deleted, Intraprocedure Event edited, Intraprocedure Meds edited, Intraprocedure Staff edited

## 2021-12-20 NOTE — ANESTHESIA POSTPROCEDURE EVALUATION
Patient: mE Munoz    Procedure Summary     Date: 12/20/21 Room / Location: [facility] ENDOSCOPY 2 / [facility] ENDOSCOPY    Anesthesia Start: 1259 Anesthesia Stop: 1313    Procedure: COLONOSCOPY (N/A ) Diagnosis:       Personal history of colonic polyps      (Personal history of colonic polyps [Z86.010])    Surgeons: Danny Gardner DO Provider: Dot Curry CRNA    Anesthesia Type: MAC ASA Status: 3          Anesthesia Type: MAC    Vitals  No vitals data found for the desired time range.          Post Anesthesia Care and Evaluation    Patient location during evaluation: PACU  Patient participation: complete - patient participated  Level of consciousness: awake and alert  Pain score: 0  Pain management: adequate  Airway patency: patent  Anesthetic complications: No anesthetic complications  PONV Status: none  Cardiovascular status: acceptable  Respiratory status: acceptable  Hydration status: acceptable

## 2021-12-20 NOTE — ANESTHESIA PREPROCEDURE EVALUATION
Anesthesia Evaluation     history of anesthetic complications: PONV  NPO Solid Status: > 8 hours  NPO Liquid Status: > 8 hours           Airway   Mallampati: III  TM distance: <3 FB  Neck ROM: limited  Possible difficult intubation  Dental - normal exam     Pulmonary - negative pulmonary ROS    breath sounds clear to auscultation  Cardiovascular   Exercise tolerance: good (4-7 METS)    Rhythm: regular  Rate: normal    (+) hypertension,       Neuro/Psych- negative ROS  GI/Hepatic/Renal/Endo      Musculoskeletal     Abdominal    Substance History      OB/GYN          Other                          Anesthesia Plan    ASA 3     MAC       Anesthetic plan, all risks, benefits, and alternatives have been provided, discussed and informed consent has been obtained with: patient.    Plan discussed with CRNA.

## 2022-09-01 ENCOUNTER — PREP FOR SURGERY (OUTPATIENT)
Dept: OTHER | Facility: HOSPITAL | Age: 77
End: 2022-09-01

## 2022-09-01 DIAGNOSIS — K30 FUNCTIONAL DYSPEPSIA: Primary | ICD-10-CM

## 2022-09-01 RX ORDER — DEXTROSE AND SODIUM CHLORIDE 5; .45 G/100ML; G/100ML
30 INJECTION, SOLUTION INTRAVENOUS CONTINUOUS PRN
Status: CANCELLED | OUTPATIENT
Start: 2022-09-06

## 2022-09-06 ENCOUNTER — HOSPITAL ENCOUNTER (OUTPATIENT)
Facility: HOSPITAL | Age: 77
Setting detail: HOSPITAL OUTPATIENT SURGERY
Discharge: HOME | End: 2022-09-06
Attending: INTERNAL MEDICINE | Admitting: INTERNAL MEDICINE

## 2022-09-06 ENCOUNTER — ANESTHESIA EVENT (OUTPATIENT)
Dept: GASTROENTEROLOGY | Facility: HOSPITAL | Age: 77
End: 2022-09-06

## 2022-09-06 ENCOUNTER — ANESTHESIA (OUTPATIENT)
Dept: GASTROENTEROLOGY | Facility: HOSPITAL | Age: 77
End: 2022-09-06

## 2022-09-06 VITALS
SYSTOLIC BLOOD PRESSURE: 134 MMHG | RESPIRATION RATE: 18 BRPM | WEIGHT: 163 LBS | BODY MASS INDEX: 27.16 KG/M2 | HEART RATE: 68 BPM | TEMPERATURE: 97.3 F | OXYGEN SATURATION: 100 % | HEIGHT: 65 IN | DIASTOLIC BLOOD PRESSURE: 63 MMHG

## 2022-09-06 DIAGNOSIS — K30 FUNCTIONAL DYSPEPSIA: ICD-10-CM

## 2022-09-06 PROCEDURE — 88305 TISSUE EXAM BY PATHOLOGIST: CPT

## 2022-09-06 PROCEDURE — 25010000002 PROPOFOL 10 MG/ML EMULSION

## 2022-09-06 PROCEDURE — 87071 CULTURE AEROBIC QUANT OTHER: CPT | Performed by: INTERNAL MEDICINE

## 2022-09-06 RX ORDER — DEXTROSE AND SODIUM CHLORIDE 5; .45 G/100ML; G/100ML
30 INJECTION, SOLUTION INTRAVENOUS CONTINUOUS PRN
Status: DISCONTINUED | OUTPATIENT
Start: 2022-09-06 | End: 2022-09-06 | Stop reason: HOSPADM

## 2022-09-06 RX ORDER — PROPOFOL 10 MG/ML
VIAL (ML) INTRAVENOUS AS NEEDED
Status: DISCONTINUED | OUTPATIENT
Start: 2022-09-06 | End: 2022-09-06 | Stop reason: SURG

## 2022-09-06 RX ORDER — LIDOCAINE HYDROCHLORIDE 20 MG/ML
INJECTION, SOLUTION INTRAVENOUS AS NEEDED
Status: DISCONTINUED | OUTPATIENT
Start: 2022-09-06 | End: 2022-09-06 | Stop reason: SURG

## 2022-09-06 RX ADMIN — DEXTROSE AND SODIUM CHLORIDE 30 ML/HR: 5; 450 INJECTION, SOLUTION INTRAVENOUS at 08:42

## 2022-09-06 RX ADMIN — PROPOFOL 20 MG: 10 INJECTION, EMULSION INTRAVENOUS at 09:20

## 2022-09-06 RX ADMIN — PROPOFOL 70 MG: 10 INJECTION, EMULSION INTRAVENOUS at 09:19

## 2022-09-06 RX ADMIN — PROPOFOL 20 MG: 10 INJECTION, EMULSION INTRAVENOUS at 09:23

## 2022-09-06 RX ADMIN — LIDOCAINE HYDROCHLORIDE 40 MG: 20 INJECTION, SOLUTION INTRAVENOUS at 09:19

## 2022-09-06 NOTE — ANESTHESIA PREPROCEDURE EVALUATION
Anesthesia Evaluation     Patient summary reviewed and Nursing notes reviewed   history of anesthetic complications: PONV  NPO Solid Status: > 8 hours  NPO Liquid Status: > 8 hours           Airway   Mallampati: III  TM distance: <3 FB  Neck ROM: limited  Possible difficult intubation  Dental    (+) partials    Comment: upper    Pulmonary - negative pulmonary ROS    breath sounds clear to auscultation  (-) not a smoker  Cardiovascular   Exercise tolerance: good (4-7 METS)    Patient on routine beta blocker  Rhythm: regular  Rate: normal    (+) hypertension, hyperlipidemia,       Neuro/Psych- negative ROS  GI/Hepatic/Renal/Endo    (+)  hiatal hernia, GERD,  thyroid problem     Musculoskeletal     Abdominal    Substance History      OB/GYN          Other      history of cancer      Other Comment: Breast CA; post mastectomy        Anesthesia Evaluation     history of anesthetic complications: PONV  NPO Solid Status: > 8 hours  NPO Liquid Status: > 8 hours           Airway   Mallampati: III  TM distance: <3 FB  Neck ROM: limited  Possible difficult intubation  Dental - normal exam     Pulmonary - negative pulmonary ROS    breath sounds clear to auscultation  Cardiovascular   Exercise tolerance: good (4-7 METS)    Rhythm: regular  Rate: normal    (+) hypertension,       Neuro/Psych- negative ROS  GI/Hepatic/Renal/Endo      Musculoskeletal     Abdominal    Substance History      OB/GYN          Other                          Anesthesia Plan    ASA 3     MAC       Anesthetic plan, all risks, benefits, and alternatives have been provided, discussed and informed consent has been obtained with: patient.    Plan discussed with CRNA.               Anesthesia Plan    ASA 3     MAC     intravenous induction     Anesthetic plan, risks, benefits, and alternatives have been provided, discussed and informed consent has been obtained with: patient.    Plan discussed with CRNA.

## 2022-09-06 NOTE — ANESTHESIA POSTPROCEDURE EVALUATION
Patient: Em Munoz    Procedure Summary     Date: 09/06/22 Room / Location: [facility] ENDOSCOPY 2 / [facility] ENDOSCOPY    Anesthesia Start: 0910 Anesthesia Stop: 0930    Procedure: ESOPHAGOGASTRODUODENOSCOPY 9:30 (N/A ) Diagnosis:       Functional dyspepsia      (Functional dyspepsia [K30])    Surgeons: Danny Gardner DO Provider: Mabel Mayberry CRNA    Anesthesia Type: MAC ASA Status: 3          Anesthesia Type: MAC    Vitals  No vitals data found for the desired time range.          Post Anesthesia Care and Evaluation    Patient location during evaluation: bedside  Patient participation: waiting for patient participation  Level of consciousness: responsive to verbal stimuli  Pain management: adequate    Airway patency: patent  Anesthetic complications: No anesthetic complications  PONV Status: none  Cardiovascular status: acceptable  Respiratory status: acceptable  Hydration status: acceptable    Comments: ---------------------------               09/06/22                      0830         ---------------------------   BP:          126/73         Pulse:         66           Resp:          18           Temp:   97.5 °F (36.4 °C)   SpO2:          99%         ---------------------------

## 2022-09-06 NOTE — H&P
Rebeka Singh DO,[facility]  Gastroenterology  Hepatology  Endoscopy  Board Certified in Internal Medicine and gastroenterology  [address], [address]  [place]. [address]  - [phone]   F - [phone]     GASTROENTEROLOGY HISTORY AND PHYSICAL  NOTE   REBEKA SINGH DO.         SUBJECTIVE:   9/6/2022    Name: Em Munoz  DOD: 1945        Chief Complaint:       Subjective : Dyspepsia.  Concern regarding acid reflux and hiatal hernia  Patient is 77 y.o. female presents with desire for elective EGD with biopsy and culture.      ROS/HISTORY/ CURRENT MEDICATIONS/OBJECTIVE/VS/PE:   Review of Systems:  All systems unremarkable unless specified below.  Constitutional   HENT  Eyes   Respiratory    Cardiovascular  Gastrointestinal   Endocrine  Genitourinary    Musculoskeletal   Skin  Allergic/Immunologic    Neurological    Hematological  Psychiatric/Behavioral    History:     Past Medical History:   Diagnosis Date   • Cancer (HCC)     breast   • Disease of thyroid gland    • Esophageal stricture    • GERD (gastroesophageal reflux disease)    • Hiatal hernia    • Hyperlipidemia    • Hypertension    • Pneumonia      Past Surgical History:   Procedure Laterality Date   • APPENDECTOMY     • BREAST SURGERY     • CATARACT EXTRACTION, BILATERAL     • CHOLECYSTECTOMY     • COLONOSCOPY N/A 3/30/2018    Procedure: COLONOSCOPY;  Surgeon: Cristopher Godoy MD;  Location: [facility] ENDOSCOPY;  Service: Gastroenterology   • COLONOSCOPY N/A 12/20/2021    Procedure: COLONOSCOPY;  Surgeon: Rebeka Singh DO;  Location: [facility] ENDOSCOPY;  Service: Gastroenterology;  Laterality: N/A;   • MASTECTOMY     • MASTECTOMY Right      Family History   Problem Relation Age of Onset   • Heart attack Father    • Diabetes Father    • Hypertension Sister    • Diabetes Sister    • Heart attack Brother    • Hypertension Brother    • Stroke Brother    • Hypertension Sister    • COPD Mother    • Diabetes Sister    • Stroke  Brother    • Heart attack Brother    • Abnormal EKG Son      Social History     Tobacco Use   • Smoking status: Never Smoker   • Smokeless tobacco: Never Used   Vaping Use   • Vaping Use: Never used   Substance Use Topics   • Alcohol use: No   • Drug use: Never     Prior to Admission medications    Medication Sig Start Date End Date Taking? Authorizing Provider   citalopram (CeleXA) 20 MG tablet Take 20 mg by mouth Daily. 1/2 tab daily   Yes Malinda Rodriguez MD   levothyroxine sodium (TIROSINT) 75 MCG capsule Take 75 mcg by mouth Daily.   Yes Malinda Rodriguez MD   losartan (COZAAR) 50 MG tablet Take 100 mg by mouth Daily.   Yes Malinda Rodriguez MD   omeprazole (priLOSEC) 20 MG capsule Take 20 mg by mouth Daily.   Yes Malinda Rodriguez MD   vitamin D3 125 MCG (5000 UT) capsule capsule Take 5,000 Units by mouth Daily.   Yes Malinda Rodriguez MD   cloNIDine (CATAPRES) 0.1 MG tablet Take 1 tablet by mouth 2 (Two) Times a Day As Needed for High Blood Pressure (HBP >180/100). 3/3/21   Ayden Nayak MD   melatonin 5 MG tablet tablet Take 10 mg by mouth Every Night.    Malinda Rodriguez MD   metoprolol tartrate (LOPRESSOR) 25 MG tablet Take 25 mg by mouth 2 (Two) Times a Day.    Malinda Rodriguez MD   Omega-3 Fatty Acids (OMEGA-3 EPA FISH OIL PO) Take 600 mg by mouth Daily.    Malinda Rodriguez MD   temazepam (RESTORIL) 30 MG capsule Take 30 mg by mouth At Night As Needed for sleep.    Malinda Rodriguez MD   traZODone (DESYREL) 50 MG tablet Take 50 mg by mouth Every Night.    Malinda Rodriguez MD     Allergies:  Atorvastatin, Ciprofloxacin, Penicillins, and Penicillins    I have reviewed the patients medical history, surgical history and family history in the available medical record system.     Current Medications:     Current Facility-Administered Medications   Medication Dose Route Frequency Provider Last Rate Last Admin   • dextrose 5 % and sodium chloride 0.45 % infusion   30 mL/hr Intravenous Continuous PRN Danny Gardner DO           Objective     Physical Exam:   Temp:  [97.5 °F (36.4 °C)] 97.5 °F (36.4 °C)  Heart Rate:  [66] 66  Resp:  [18] 18  BP: (126)/(73) 126/73    Physical Exam:  General Appearance:    Alert, cooperative, in no acute distress   Head:    Normocephalic, without obvious abnormality, atraumatic   Eyes:            Lids and lashes normal, conjunctivae and sclerae normal, no icterus, no pallor, corneas clear, PERRLA   Ears:    Ears appear intact with no abnormalities noted   Throat:   No oral lesions, no thrush, oral mucosa moist   Neck:   No adenopathy, supple, trachea midline, no thyromegaly, no  carotid bruit, no JVD   Back:     No kyphosis present, no scoliosis present, no skin lesions,   erythema or scars, no tenderness to percussion or                 palpation,  range of motion normal   Lungs:     Clear to auscultation,respirations regular, even and         unlabored    Heart:    Regular rhythm and normal rate, normal S1 and S2, no  murmur, no gallop, no rub, no click   Breast Exam:    Deferred   Abdomen:     Normal bowel sounds, no masses, no organomegaly, soft  nontender, nondistended, no guarding, no rebound                 tenderness   Genitalia:    Deferred   Extremities:   Moves all extremities well, no edema, no cyanosis, no          redness   Pulses:   Pulses palpable and equal bilaterally   Skin:   No bleeding, bruising or rash   Lymph nodes:   No palpable adenopathy   Neurologic:   Cranial nerves 2 - 12 grossly intact, sensation intact, DTR     present and equal bilaterally      Results Review:     Lab Results   Component Value Date    WBC 6.84 03/03/2021    WBC 7 03/07/2019    WBC 5 07/05/2018    HGB 12.4 03/03/2021    HGB 13.3 03/07/2019    HGB 13 07/05/2018    HCT 37.4 03/03/2021    HCT 41.6 03/07/2019    HCT 40.4 07/05/2018     03/03/2021     03/07/2019     07/05/2018             No results found for: LIPASE  Lab Results    Component Value Date    INR 1.01 05/25/2017     No components found for: CATHCX    Radiology Review:  Imaging Results (Last 72 Hours)     ** No results found for the last 72 hours. **           I reviewed the patient's new clinical results.  I reviewed the patient's new imaging results and agree with the interpretation.     ASSESSMENT/PLAN:   ASSESSMENT:  1.  Dyspepsia  2.  Acid reflux    PLAN:  1.  Esophagogastroduodenoscopy with biopsy and culture    Risk and benefits associated with the procedure are reviewed with the patient.  Patient wished to proceed     Danny Gardner DO  09/06/22  08:41 CDT

## 2022-09-08 LAB
BACTERIA SPEC AEROBE CULT: ABNORMAL

## 2022-09-09 LAB — REF LAB TEST METHOD: NORMAL

## 2024-10-09 NOTE — PROGRESS NOTES
72-year-old female returns after recent colonoscopy.  This was her first colonoscopy many years.  Prep was adequate to identify polyps.  Findings were significant for diverticulosis sigmoid colon and also a small 4 mm tubular adenomatous polyp removed from her rectum.  I went over these findings with her and recommended she have another colonoscopy in 3 years or sooner she has any other concerns or questions.   No auth needed for 2 week [facility]  Reference # [id number] on 10/8/24     Device registered on o website to send to pt

## (undated) DEVICE — SINGLE-USE BIOPSY FORCEPS: Brand: RADIAL JAW 4

## (undated) DEVICE — CANN SMPL SOFTECH BIFLO ETCO2 A/M 7FT

## (undated) DEVICE — TRAP,MUCUS SPECIMEN,40CC: Brand: MEDLINE

## (undated) DEVICE — MSK ENDO PORT O2 POM ELITE CURAPLEX A/

## (undated) DEVICE — BITEBLOCK ENDO W/STRAP 60F A/ LF DISP